# Patient Record
Sex: FEMALE | Race: WHITE | NOT HISPANIC OR LATINO | Employment: OTHER | URBAN - METROPOLITAN AREA
[De-identification: names, ages, dates, MRNs, and addresses within clinical notes are randomized per-mention and may not be internally consistent; named-entity substitution may affect disease eponyms.]

---

## 2024-06-02 ENCOUNTER — APPOINTMENT (EMERGENCY)
Dept: CT IMAGING | Facility: HOSPITAL | Age: 89
DRG: 194 | End: 2024-06-02
Payer: COMMERCIAL

## 2024-06-02 ENCOUNTER — HOSPITAL ENCOUNTER (INPATIENT)
Facility: HOSPITAL | Age: 89
LOS: 1 days | Discharge: NURSING, REHAB OR LONG-TERM CARE | DRG: 194 | End: 2024-06-03
Attending: STUDENT IN AN ORGANIZED HEALTH CARE EDUCATION/TRAINING PROGRAM | Admitting: INTERNAL MEDICINE
Payer: COMMERCIAL

## 2024-06-02 ENCOUNTER — APPOINTMENT (EMERGENCY)
Dept: RADIOLOGY | Facility: HOSPITAL | Age: 89
DRG: 194 | End: 2024-06-02
Payer: COMMERCIAL

## 2024-06-02 DIAGNOSIS — E03.9 HYPOTHYROID: ICD-10-CM

## 2024-06-02 DIAGNOSIS — J18.9 PNEUMONIA: ICD-10-CM

## 2024-06-02 DIAGNOSIS — R07.9 CHEST PAIN: ICD-10-CM

## 2024-06-02 DIAGNOSIS — J44.1 COPD EXACERBATION (HCC): Primary | ICD-10-CM

## 2024-06-02 PROBLEM — I48.91 A-FIB (HCC): Status: ACTIVE | Noted: 2024-06-02

## 2024-06-02 PROBLEM — R19.7 DIARRHEA: Status: ACTIVE | Noted: 2024-06-02

## 2024-06-02 PROBLEM — I50.32 CHRONIC DIASTOLIC HEART FAILURE (HCC): Status: ACTIVE | Noted: 2024-06-02

## 2024-06-02 PROBLEM — J44.9 COPD (CHRONIC OBSTRUCTIVE PULMONARY DISEASE) (HCC): Status: ACTIVE | Noted: 2024-06-02

## 2024-06-02 PROBLEM — Z86.73 HX OF TIA (TRANSIENT ISCHEMIC ATTACK) AND STROKE: Status: ACTIVE | Noted: 2024-06-02

## 2024-06-02 PROBLEM — R13.10 DYSPHAGIA: Status: ACTIVE | Noted: 2024-06-02

## 2024-06-02 PROBLEM — R06.89 ACUTE RESPIRATORY INSUFFICIENCY: Status: ACTIVE | Noted: 2024-06-02

## 2024-06-02 PROBLEM — R79.89 ELEVATED TSH: Status: ACTIVE | Noted: 2024-06-02

## 2024-06-02 LAB
2HR DELTA HS TROPONIN: -2 NG/L
ALBUMIN SERPL BCP-MCNC: 3.8 G/DL (ref 3.5–5)
ALP SERPL-CCNC: 60 U/L (ref 34–104)
ALT SERPL W P-5'-P-CCNC: 6 U/L (ref 7–52)
ANION GAP SERPL CALCULATED.3IONS-SCNC: 7 MMOL/L (ref 4–13)
AST SERPL W P-5'-P-CCNC: 16 U/L (ref 13–39)
BASOPHILS # BLD AUTO: 0.05 THOUSANDS/ÂΜL (ref 0–0.1)
BASOPHILS NFR BLD AUTO: 1 % (ref 0–1)
BILIRUB SERPL-MCNC: 0.7 MG/DL (ref 0.2–1)
BNP SERPL-MCNC: 73 PG/ML (ref 0–100)
BUN SERPL-MCNC: 13 MG/DL (ref 5–25)
CALCIUM SERPL-MCNC: 9.4 MG/DL (ref 8.4–10.2)
CARDIAC TROPONIN I PNL SERPL HS: 6 NG/L
CARDIAC TROPONIN I PNL SERPL HS: 8 NG/L
CHLORIDE SERPL-SCNC: 104 MMOL/L (ref 96–108)
CO2 SERPL-SCNC: 31 MMOL/L (ref 21–32)
CREAT SERPL-MCNC: 1 MG/DL (ref 0.6–1.3)
D DIMER PPP FEU-MCNC: 0.72 UG/ML FEU
EOSINOPHIL # BLD AUTO: 0.23 THOUSAND/ÂΜL (ref 0–0.61)
EOSINOPHIL NFR BLD AUTO: 3 % (ref 0–6)
ERYTHROCYTE [DISTWIDTH] IN BLOOD BY AUTOMATED COUNT: 12.6 % (ref 11.6–15.1)
FLUAV RNA RESP QL NAA+PROBE: NEGATIVE
FLUBV RNA RESP QL NAA+PROBE: NEGATIVE
GFR SERPL CREATININE-BSD FRML MDRD: 49 ML/MIN/1.73SQ M
GLUCOSE SERPL-MCNC: 115 MG/DL (ref 65–140)
HCT VFR BLD AUTO: 46.5 % (ref 34.8–46.1)
HGB BLD-MCNC: 15 G/DL (ref 11.5–15.4)
IMM GRANULOCYTES # BLD AUTO: 0.03 THOUSAND/UL (ref 0–0.2)
IMM GRANULOCYTES NFR BLD AUTO: 0 % (ref 0–2)
L PNEUMO1 AG UR QL IA.RAPID: NEGATIVE
LIPASE SERPL-CCNC: 28 U/L (ref 11–82)
LYMPHOCYTES # BLD AUTO: 3.27 THOUSANDS/ÂΜL (ref 0.6–4.47)
LYMPHOCYTES NFR BLD AUTO: 44 % (ref 14–44)
MCH RBC QN AUTO: 31.6 PG (ref 26.8–34.3)
MCHC RBC AUTO-ENTMCNC: 32.3 G/DL (ref 31.4–37.4)
MCV RBC AUTO: 98 FL (ref 82–98)
MONOCYTES # BLD AUTO: 0.51 THOUSAND/ÂΜL (ref 0.17–1.22)
MONOCYTES NFR BLD AUTO: 7 % (ref 4–12)
NEUTROPHILS # BLD AUTO: 3.41 THOUSANDS/ÂΜL (ref 1.85–7.62)
NEUTS SEG NFR BLD AUTO: 45 % (ref 43–75)
NRBC BLD AUTO-RTO: 0 /100 WBCS
PLATELET # BLD AUTO: 217 THOUSANDS/UL (ref 149–390)
PMV BLD AUTO: 10.2 FL (ref 8.9–12.7)
POTASSIUM SERPL-SCNC: 3.7 MMOL/L (ref 3.5–5.3)
PROCALCITONIN SERPL-MCNC: <0.05 NG/ML
PROT SERPL-MCNC: 6.6 G/DL (ref 6.4–8.4)
RBC # BLD AUTO: 4.75 MILLION/UL (ref 3.81–5.12)
RSV RNA RESP QL NAA+PROBE: NEGATIVE
SARS-COV-2 RNA RESP QL NAA+PROBE: NEGATIVE
SODIUM SERPL-SCNC: 142 MMOL/L (ref 135–147)
TSH SERPL DL<=0.05 MIU/L-ACNC: 10.36 UIU/ML (ref 0.45–4.5)
WBC # BLD AUTO: 7.5 THOUSAND/UL (ref 4.31–10.16)

## 2024-06-02 PROCEDURE — 87081 CULTURE SCREEN ONLY: CPT | Performed by: PHYSICIAN ASSISTANT

## 2024-06-02 PROCEDURE — 99285 EMERGENCY DEPT VISIT HI MDM: CPT

## 2024-06-02 PROCEDURE — 1123F ACP DISCUSS/DSCN MKR DOCD: CPT | Performed by: STUDENT IN AN ORGANIZED HEALTH CARE EDUCATION/TRAINING PROGRAM

## 2024-06-02 PROCEDURE — 84484 ASSAY OF TROPONIN QUANT: CPT | Performed by: STUDENT IN AN ORGANIZED HEALTH CARE EDUCATION/TRAINING PROGRAM

## 2024-06-02 PROCEDURE — 94760 N-INVAS EAR/PLS OXIMETRY 1: CPT

## 2024-06-02 PROCEDURE — 85379 FIBRIN DEGRADATION QUANT: CPT | Performed by: STUDENT IN AN ORGANIZED HEALTH CARE EDUCATION/TRAINING PROGRAM

## 2024-06-02 PROCEDURE — 84145 PROCALCITONIN (PCT): CPT | Performed by: STUDENT IN AN ORGANIZED HEALTH CARE EDUCATION/TRAINING PROGRAM

## 2024-06-02 PROCEDURE — 71250 CT THORAX DX C-: CPT

## 2024-06-02 PROCEDURE — 0241U HB NFCT DS VIR RESP RNA 4 TRGT: CPT | Performed by: STUDENT IN AN ORGANIZED HEALTH CARE EDUCATION/TRAINING PROGRAM

## 2024-06-02 PROCEDURE — 87147 CULTURE TYPE IMMUNOLOGIC: CPT | Performed by: PHYSICIAN ASSISTANT

## 2024-06-02 PROCEDURE — 94664 DEMO&/EVAL PT USE INHALER: CPT

## 2024-06-02 PROCEDURE — 83880 ASSAY OF NATRIURETIC PEPTIDE: CPT | Performed by: STUDENT IN AN ORGANIZED HEALTH CARE EDUCATION/TRAINING PROGRAM

## 2024-06-02 PROCEDURE — 74176 CT ABD & PELVIS W/O CONTRAST: CPT

## 2024-06-02 PROCEDURE — 71045 X-RAY EXAM CHEST 1 VIEW: CPT

## 2024-06-02 PROCEDURE — 36415 COLL VENOUS BLD VENIPUNCTURE: CPT | Performed by: STUDENT IN AN ORGANIZED HEALTH CARE EDUCATION/TRAINING PROGRAM

## 2024-06-02 PROCEDURE — 94640 AIRWAY INHALATION TREATMENT: CPT

## 2024-06-02 PROCEDURE — 85025 COMPLETE CBC W/AUTO DIFF WBC: CPT | Performed by: STUDENT IN AN ORGANIZED HEALTH CARE EDUCATION/TRAINING PROGRAM

## 2024-06-02 PROCEDURE — 93005 ELECTROCARDIOGRAM TRACING: CPT

## 2024-06-02 PROCEDURE — 80053 COMPREHEN METABOLIC PANEL: CPT | Performed by: STUDENT IN AN ORGANIZED HEALTH CARE EDUCATION/TRAINING PROGRAM

## 2024-06-02 PROCEDURE — 70450 CT HEAD/BRAIN W/O DYE: CPT

## 2024-06-02 PROCEDURE — 99285 EMERGENCY DEPT VISIT HI MDM: CPT | Performed by: STUDENT IN AN ORGANIZED HEALTH CARE EDUCATION/TRAINING PROGRAM

## 2024-06-02 PROCEDURE — 84443 ASSAY THYROID STIM HORMONE: CPT | Performed by: STUDENT IN AN ORGANIZED HEALTH CARE EDUCATION/TRAINING PROGRAM

## 2024-06-02 PROCEDURE — 99223 1ST HOSP IP/OBS HIGH 75: CPT | Performed by: STUDENT IN AN ORGANIZED HEALTH CARE EDUCATION/TRAINING PROGRAM

## 2024-06-02 PROCEDURE — 87449 NOS EACH ORGANISM AG IA: CPT | Performed by: PHYSICIAN ASSISTANT

## 2024-06-02 PROCEDURE — 96374 THER/PROPH/DIAG INJ IV PUSH: CPT

## 2024-06-02 PROCEDURE — 92610 EVALUATE SWALLOWING FUNCTION: CPT

## 2024-06-02 PROCEDURE — 83690 ASSAY OF LIPASE: CPT | Performed by: STUDENT IN AN ORGANIZED HEALTH CARE EDUCATION/TRAINING PROGRAM

## 2024-06-02 RX ORDER — LEVOTHYROXINE SODIUM 0.05 MG/1
50 TABLET ORAL DAILY
Status: DISCONTINUED | OUTPATIENT
Start: 2024-06-02 | End: 2024-06-03 | Stop reason: HOSPADM

## 2024-06-02 RX ORDER — SODIUM CHLORIDE, SODIUM GLUCONATE, SODIUM ACETATE, POTASSIUM CHLORIDE, MAGNESIUM CHLORIDE, SODIUM PHOSPHATE, DIBASIC, AND POTASSIUM PHOSPHATE .53; .5; .37; .037; .03; .012; .00082 G/100ML; G/100ML; G/100ML; G/100ML; G/100ML; G/100ML; G/100ML
500 INJECTION, SOLUTION INTRAVENOUS ONCE
Status: COMPLETED | OUTPATIENT
Start: 2024-06-02 | End: 2024-06-02

## 2024-06-02 RX ORDER — LANOLIN ALCOHOL/MO/W.PET/CERES
6 CREAM (GRAM) TOPICAL
Status: DISCONTINUED | OUTPATIENT
Start: 2024-06-02 | End: 2024-06-03 | Stop reason: HOSPADM

## 2024-06-02 RX ORDER — FLUOXETINE HYDROCHLORIDE 20 MG/1
20 CAPSULE ORAL DAILY
Status: DISCONTINUED | OUTPATIENT
Start: 2024-06-02 | End: 2024-06-03 | Stop reason: HOSPADM

## 2024-06-02 RX ORDER — ACETAMINOPHEN 325 MG/1
650 TABLET ORAL EVERY 6 HOURS PRN
Status: DISCONTINUED | OUTPATIENT
Start: 2024-06-02 | End: 2024-06-03 | Stop reason: HOSPADM

## 2024-06-02 RX ORDER — AZITHROMYCIN 500 MG/1
500 TABLET, FILM COATED ORAL EVERY 24 HOURS
Status: DISCONTINUED | OUTPATIENT
Start: 2024-06-03 | End: 2024-06-03 | Stop reason: HOSPADM

## 2024-06-02 RX ORDER — GABAPENTIN 100 MG/1
100 CAPSULE ORAL DAILY
COMMUNITY

## 2024-06-02 RX ORDER — METHYLPREDNISOLONE SODIUM SUCCINATE 125 MG/2ML
125 INJECTION, POWDER, LYOPHILIZED, FOR SOLUTION INTRAMUSCULAR; INTRAVENOUS ONCE
Status: COMPLETED | OUTPATIENT
Start: 2024-06-02 | End: 2024-06-02

## 2024-06-02 RX ORDER — CLOPIDOGREL BISULFATE 75 MG/1
75 TABLET ORAL DAILY
Status: DISCONTINUED | OUTPATIENT
Start: 2024-06-02 | End: 2024-06-03 | Stop reason: HOSPADM

## 2024-06-02 RX ORDER — NYSTATIN 100000 [USP'U]/G
POWDER TOPICAL 2 TIMES DAILY
Status: DISCONTINUED | OUTPATIENT
Start: 2024-06-02 | End: 2024-06-03 | Stop reason: HOSPADM

## 2024-06-02 RX ORDER — UREA 10 %
5 LOTION (ML) TOPICAL
Status: DISCONTINUED | OUTPATIENT
Start: 2024-06-02 | End: 2024-06-02

## 2024-06-02 RX ORDER — FLUOXETINE 20 MG/1
1 TABLET, FILM COATED ORAL DAILY
COMMUNITY

## 2024-06-02 RX ORDER — ENOXAPARIN SODIUM 100 MG/ML
40 INJECTION SUBCUTANEOUS DAILY
Status: DISCONTINUED | OUTPATIENT
Start: 2024-06-02 | End: 2024-06-03 | Stop reason: HOSPADM

## 2024-06-02 RX ORDER — PANTOPRAZOLE SODIUM 40 MG/1
40 TABLET, DELAYED RELEASE ORAL DAILY
COMMUNITY

## 2024-06-02 RX ORDER — CLOPIDOGREL BISULFATE 75 MG/1
1 TABLET ORAL DAILY
COMMUNITY

## 2024-06-02 RX ORDER — GUAIFENESIN 600 MG/1
600 TABLET, EXTENDED RELEASE ORAL 2 TIMES DAILY
Status: DISCONTINUED | OUTPATIENT
Start: 2024-06-02 | End: 2024-06-03 | Stop reason: HOSPADM

## 2024-06-02 RX ORDER — CEFTRIAXONE 2 G/50ML
2000 INJECTION, SOLUTION INTRAVENOUS ONCE
Status: COMPLETED | OUTPATIENT
Start: 2024-06-02 | End: 2024-06-02

## 2024-06-02 RX ORDER — MAGNESIUM HYDROXIDE/ALUMINUM HYDROXICE/SIMETHICONE 120; 1200; 1200 MG/30ML; MG/30ML; MG/30ML
30 SUSPENSION ORAL EVERY 6 HOURS PRN
Status: DISCONTINUED | OUTPATIENT
Start: 2024-06-02 | End: 2024-06-03 | Stop reason: HOSPADM

## 2024-06-02 RX ORDER — CALCIUM CARBONATE 300MG(750)
400 TABLET,CHEWABLE ORAL
Status: ON HOLD | COMMUNITY
End: 2024-06-02 | Stop reason: ALTCHOICE

## 2024-06-02 RX ORDER — GABAPENTIN 100 MG/1
100 CAPSULE ORAL DAILY
Status: DISCONTINUED | OUTPATIENT
Start: 2024-06-02 | End: 2024-06-03 | Stop reason: HOSPADM

## 2024-06-02 RX ORDER — OMEGA-3S/DHA/EPA/FISH OIL/D3 300MG-1000
2000 CAPSULE ORAL DAILY
COMMUNITY

## 2024-06-02 RX ORDER — BENZONATATE 100 MG/1
100 CAPSULE ORAL 3 TIMES DAILY PRN
Status: DISCONTINUED | OUTPATIENT
Start: 2024-06-02 | End: 2024-06-03 | Stop reason: HOSPADM

## 2024-06-02 RX ORDER — UREA 10 %
6 LOTION (ML) TOPICAL
Status: DISCONTINUED | OUTPATIENT
Start: 2024-06-02 | End: 2024-06-02

## 2024-06-02 RX ORDER — ONDANSETRON 2 MG/ML
4 INJECTION INTRAMUSCULAR; INTRAVENOUS EVERY 6 HOURS PRN
Status: DISCONTINUED | OUTPATIENT
Start: 2024-06-02 | End: 2024-06-03 | Stop reason: HOSPADM

## 2024-06-02 RX ORDER — SODIUM CHLORIDE, SODIUM GLUCONATE, SODIUM ACETATE, POTASSIUM CHLORIDE, MAGNESIUM CHLORIDE, SODIUM PHOSPHATE, DIBASIC, AND POTASSIUM PHOSPHATE .53; .5; .37; .037; .03; .012; .00082 G/100ML; G/100ML; G/100ML; G/100ML; G/100ML; G/100ML; G/100ML
100 INJECTION, SOLUTION INTRAVENOUS CONTINUOUS
Status: DISCONTINUED | OUTPATIENT
Start: 2024-06-02 | End: 2024-06-03 | Stop reason: HOSPADM

## 2024-06-02 RX ORDER — NYSTATIN 100000 [USP'U]/G
POWDER TOPICAL 2 TIMES DAILY
COMMUNITY

## 2024-06-02 RX ORDER — SODIUM CHLORIDE 9 MG/ML
3 INJECTION INTRAVENOUS
Status: DISCONTINUED | OUTPATIENT
Start: 2024-06-02 | End: 2024-06-02

## 2024-06-02 RX ORDER — UREA 10 %
5 LOTION (ML) TOPICAL
COMMUNITY

## 2024-06-02 RX ORDER — LEVOTHYROXINE SODIUM 0.03 MG/1
25 TABLET ORAL DAILY
COMMUNITY
End: 2024-06-03

## 2024-06-02 RX ORDER — LANOLIN ALCOHOL/MO/W.PET/CERES
1000 CREAM (GRAM) TOPICAL DAILY
COMMUNITY

## 2024-06-02 RX ORDER — PANTOPRAZOLE SODIUM 40 MG/1
40 TABLET, DELAYED RELEASE ORAL DAILY
Status: DISCONTINUED | OUTPATIENT
Start: 2024-06-02 | End: 2024-06-03 | Stop reason: HOSPADM

## 2024-06-02 RX ORDER — LEVALBUTEROL INHALATION SOLUTION 1.25 MG/3ML
1.25 SOLUTION RESPIRATORY (INHALATION)
Status: DISCONTINUED | OUTPATIENT
Start: 2024-06-02 | End: 2024-06-03 | Stop reason: HOSPADM

## 2024-06-02 RX ORDER — CEFTRIAXONE 2 G/50ML
2000 INJECTION, SOLUTION INTRAVENOUS EVERY 24 HOURS
Status: DISCONTINUED | OUTPATIENT
Start: 2024-06-03 | End: 2024-06-03 | Stop reason: HOSPADM

## 2024-06-02 RX ADMIN — SODIUM CHLORIDE, SODIUM GLUCONATE, SODIUM ACETATE, POTASSIUM CHLORIDE, MAGNESIUM CHLORIDE, SODIUM PHOSPHATE, DIBASIC, AND POTASSIUM PHOSPHATE 100 ML/HR: .53; .5; .37; .037; .03; .012; .00082 INJECTION, SOLUTION INTRAVENOUS at 23:31

## 2024-06-02 RX ADMIN — CYANOCOBALAMIN TAB 500 MCG 1000 MCG: 500 TAB at 09:01

## 2024-06-02 RX ADMIN — Medication 2000 UNITS: at 09:01

## 2024-06-02 RX ADMIN — IPRATROPIUM BROMIDE 0.5 MG: 0.5 SOLUTION RESPIRATORY (INHALATION) at 19:45

## 2024-06-02 RX ADMIN — ENOXAPARIN SODIUM 40 MG: 40 INJECTION SUBCUTANEOUS at 09:20

## 2024-06-02 RX ADMIN — AZITHROMYCIN MONOHYDRATE 500 MG: 500 INJECTION, POWDER, LYOPHILIZED, FOR SOLUTION INTRAVENOUS at 06:05

## 2024-06-02 RX ADMIN — Medication 6 MG: at 20:39

## 2024-06-02 RX ADMIN — GUAIFENESIN 600 MG: 600 TABLET ORAL at 09:00

## 2024-06-02 RX ADMIN — SODIUM CHLORIDE, SODIUM GLUCONATE, SODIUM ACETATE, POTASSIUM CHLORIDE, MAGNESIUM CHLORIDE, SODIUM PHOSPHATE, DIBASIC, AND POTASSIUM PHOSPHATE 500 ML: .53; .5; .37; .037; .03; .012; .00082 INJECTION, SOLUTION INTRAVENOUS at 21:20

## 2024-06-02 RX ADMIN — GUAIFENESIN 600 MG: 600 TABLET ORAL at 17:14

## 2024-06-02 RX ADMIN — GABAPENTIN 100 MG: 100 CAPSULE ORAL at 09:01

## 2024-06-02 RX ADMIN — IPRATROPIUM BROMIDE 0.5 MG: 0.5 SOLUTION RESPIRATORY (INHALATION) at 08:40

## 2024-06-02 RX ADMIN — IPRATROPIUM BROMIDE 0.5 MG: 0.5 SOLUTION RESPIRATORY (INHALATION) at 14:55

## 2024-06-02 RX ADMIN — ALUMINUM HYDROXIDE, MAGNESIUM HYDROXIDE, DIMETHICONE 30 ML: 200; 20; 200 SUSPENSION ORAL at 21:23

## 2024-06-02 RX ADMIN — NYSTATIN: 100000 POWDER TOPICAL at 17:15

## 2024-06-02 RX ADMIN — FLUOXETINE HYDROCHLORIDE 20 MG: 20 CAPSULE ORAL at 09:00

## 2024-06-02 RX ADMIN — LEVALBUTEROL HYDROCHLORIDE 1.25 MG: 1.25 SOLUTION RESPIRATORY (INHALATION) at 19:45

## 2024-06-02 RX ADMIN — LEVOTHYROXINE SODIUM 50 MCG: 50 TABLET ORAL at 07:16

## 2024-06-02 RX ADMIN — LEVALBUTEROL HYDROCHLORIDE 1.25 MG: 1.25 SOLUTION RESPIRATORY (INHALATION) at 08:40

## 2024-06-02 RX ADMIN — NYSTATIN 1 APPLICATION: 100000 POWDER TOPICAL at 09:17

## 2024-06-02 RX ADMIN — METHYLPREDNISOLONE SODIUM SUCCINATE 125 MG: 125 INJECTION, POWDER, FOR SOLUTION INTRAMUSCULAR; INTRAVENOUS at 05:49

## 2024-06-02 RX ADMIN — LEVALBUTEROL HYDROCHLORIDE 1.25 MG: 1.25 SOLUTION RESPIRATORY (INHALATION) at 14:55

## 2024-06-02 RX ADMIN — CEFTRIAXONE 2000 MG: 2 INJECTION, SOLUTION INTRAVENOUS at 05:51

## 2024-06-02 RX ADMIN — CLOPIDOGREL BISULFATE 75 MG: 75 TABLET ORAL at 09:01

## 2024-06-02 RX ADMIN — IPRATROPIUM BROMIDE 0.5 MG: 0.5 SOLUTION RESPIRATORY (INHALATION) at 03:08

## 2024-06-02 RX ADMIN — PANTOPRAZOLE SODIUM 40 MG: 40 TABLET, DELAYED RELEASE ORAL at 09:01

## 2024-06-02 NOTE — ASSESSMENT & PLAN NOTE
Daughter at bedside has noted that patient has had some coughing after eating soups or some fruits  Has not yet seen speech therapy  Will consult speech therapy for evaluation  Regular diet at facility, continue for now

## 2024-06-02 NOTE — ASSESSMENT & PLAN NOTE
Home regimen: Synthroid 25 mcg daily  TSH elevated at 10.361  Increase Synthroid to 50 mcg daily  Will need TSH in 4 to 6 weeks outpatient

## 2024-06-02 NOTE — ED PROVIDER NOTES
"History  Chief Complaint   Patient presents with    Chest Pain     EMS brought pt in from [facility] woke up with chest pressure.      HPI    89 yo female, pmhx of COPD not on home oxygen, hypothyroidism, depression, CAD, A-fib, TIA, on Plavix and aspirin, sent to the ED from [facility] facility via EMS for evaluation of chest pressure and shortness of breath.  Per [facility] documentation patient was reporting left-sided weakness/pain in her head and left arm and spit up blood.  Patient says she was having a nosebleed and swallowed it and likely thereafter spit up the blood.  Per EMS patient was complaining of chest pressure by the time they came to the scene and that is what patient is complaining of currently.  EMS reports patient's pulse ox 89 to 90% on room air.  She was placed on 2-3 L nasal cannula with improvement.  She then adds that she has been having upper abdominal pain.  The patient is a poor historian and her history changes frequently throughout her assessment. Pt denies any fever, chills, visual changes, numbness/tingling, weakness, nausea, vomiting, diarrhea, recent falls. Patient reported she spit out blood onto a tissue after nosebleed.  Patient does not currently have a nosebleed and has not had any episodes of coughing or coughing up any blood on arrival.  Patient's daughter arrived to bedside shortly after patient arrival and added that patient has hx of \"double pneumonia\", COVID19, and had mild cough/cold symptoms at facility 1.5 weeks ago.  Patient's daughter reports patient's oxygen can drop to the mid 80s and she instructs patient to just take deep breaths.  She reports patient has bad reaction to albuterol and does not take breathing treatments usually.      Prior to Admission Medications   Prescriptions Last Dose Informant Patient Reported? Taking?   FLUoxetine (PROzac) 20 MG tablet   Yes No   Sig: Take 1 tablet by mouth daily   Melatonin 5 MG TABS   Yes Yes "   Sig: Take 5 mg by mouth daily at bedtime   clopidogrel (PLAVIX) 75 mg tablet   Yes No   Sig: Take 1 tablet by mouth daily   gabapentin (NEURONTIN) 100 mg capsule   Yes Yes   Sig: Take 100 mg by mouth daily at bedtime      Facility-Administered Medications: None       Past Medical History:   Diagnosis Date    A-fib (HCC)     Chronic GERD     COPD (chronic obstructive pulmonary disease) (HCC)     Coronary artery disease     Depression     Diabetes mellitus (HCC)     Erythema     Hypokalemia     Hypothyroidism     Idiopathic neuropathy     Insomnia     TIA (transient ischemic attack)     Vitamin deficiency        History reviewed. No pertinent surgical history.    History reviewed. No pertinent family history.  I have reviewed and agree with the history as documented.    E-Cigarette/Vaping     E-Cigarette/Vaping Substances     Social History     Tobacco Use    Smoking status: Never    Smokeless tobacco: Never   Substance Use Topics    Alcohol use: Never    Drug use: Never       Review of Systems    Physical Exam  Physical Exam  Vitals and nursing note reviewed.   Constitutional:       General: She is not in acute distress.     Appearance: She is not ill-appearing, toxic-appearing or diaphoretic.      Comments: Elderly female, no acute distress.   HENT:      Head: Normocephalic and atraumatic.      Mouth/Throat:      Pharynx: Oropharynx is clear.   Eyes:      Pupils: Pupils are equal, round, and reactive to light.   Cardiovascular:      Rate and Rhythm: Regular rhythm. Bradycardia present.      Comments: HR 56  Pulmonary:      Effort: Pulmonary effort is normal. No respiratory distress.      Breath sounds: Decreased breath sounds present. No wheezing.   Chest:      Chest wall: No tenderness.   Abdominal:      General: There is no distension.      Palpations: Abdomen is soft.      Tenderness: There is abdominal tenderness (mild, epigastrium).   Musculoskeletal:      Cervical back: Neck supple.      Right lower leg: No  edema.      Left lower leg: No edema.   Skin:     General: Skin is warm and dry.   Neurological:      General: No focal deficit present.      Mental Status: She is alert and oriented to person, place, and time. Mental status is at baseline.   Psychiatric:         Mood and Affect: Mood normal.         Behavior: Behavior normal.         Vital Signs  ED Triage Vitals   Temperature Pulse Respirations Blood Pressure SpO2   06/02/24 0057 06/02/24 0057 06/02/24 0057 06/02/24 0057 06/02/24 0057   98.6 °F (37 °C) 62 20 131/61 96 %      Temp Source Heart Rate Source Patient Position - Orthostatic VS BP Location FiO2 (%)   06/02/24 0057 06/02/24 0057 06/02/24 0057 06/02/24 0057 --   Oral Monitor Lying Right arm       Pain Score       06/02/24 0400       No Pain           Vitals:    06/02/24 0230 06/02/24 0305 06/02/24 0400 06/02/24 0633   BP: 130/63 128/60 111/55 133/73   Pulse: 60 56 56 61   Patient Position - Orthostatic VS: Sitting   Lying         Visual Acuity      ED Medications  Medications   sodium chloride (PF) 0.9 % injection 3 mL (has no administration in time range)   azithromycin (ZITHROMAX) 500 mg in sodium chloride 0.9% 250mL IVPB 500 mg (500 mg Intravenous New Bag 6/2/24 0605)   ipratropium (ATROVENT) 0.02 % inhalation solution 0.5 mg (0.5 mg Nebulization Given 6/2/24 0308)   cefTRIAXone (ROCEPHIN) IVPB (premix in dextrose) 2,000 mg 50 mL (2,000 mg Intravenous Continue to Inpatient Floor 6/2/24 0602)   methylPREDNISolone sodium succinate (Solu-MEDROL) injection 125 mg (125 mg Intravenous Given 6/2/24 0549)       Diagnostic Studies  Results Reviewed       Procedure Component Value Units Date/Time    FLU/RSV/COVID - if FLU/RSV clinically relevant [[id number]]  (Normal) Collected: 06/02/24 0310    Lab Status: Final result Specimen: Nares from Nose Updated: 06/02/24 0355     SARS-CoV-2 Negative     INFLUENZA A PCR Negative     INFLUENZA B PCR Negative     RSV PCR Negative    Narrative:      FOR PEDIATRIC PATIENTS -  copy/paste COVID Guidelines URL to browser: [email or web address]    SARS-CoV-2 assay is a Nucleic Acid Amplification assay intended for the  qualitative detection of nucleic acid from SARS-CoV-2 in nasopharyngeal  swabs. Results are for the presumptive identification of SARS-CoV-2 RNA.    Positive results are indicative of infection with SARS-CoV-2, the virus  causing COVID-19, but do not rule out bacterial infection or co-infection  with other viruses. Laboratories within the [place] and its  territories are required to report all positive results to the appropriate  public health authorities. Negative results do not preclude SARS-CoV-2  infection and should not be used as the sole basis for treatment or other  patient management decisions. Negative results must be combined with  clinical observations, patient history, and epidemiological information.  This test has not been FDA cleared or approved.    This test has been authorized by FDA under an Emergency Use Authorization  (EUA). This test is only authorized for the duration of time the  declaration that circumstances exist justifying the authorization of the  emergency use of an in vitro diagnostic tests for detection of SARS-CoV-2  virus and/or diagnosis of COVID-19 infection under section 564(b)(1) of  the Act, 21 U.S.C. 360bbb-3(b)(1), unless the authorization is terminated  or revoked sooner. The test has been validated but independent review by FDA  and CLIA is pending.    Test performed using [employer] GeneXpert: This RT-PCR assay targets N2,  a region unique to SARS-CoV-2. A conserved region in the E-gene was chosen  for pan-Sarbecovirus detection which includes SARS-CoV-2.    According to CMS-2020-01-R, this platform meets the definition of high-throughput technology.    HS Troponin I 2hr [[id number]]  (Normal) Collected: 06/02/24 0310    Lab Status: Final result Specimen: Blood from Arm, Right  Updated: 06/02/24 0347     hs TnI 2hr 6 ng/L      Delta 2hr hsTnI -2 ng/L     Procalcitonin [[id number]]  (Normal) Collected: 06/02/24 0101    Lab Status: Final result Specimen: Blood from Arm, Left Updated: 06/02/24 0339     Procalcitonin <0.05 ng/ml     TSH [[id number]]  (Abnormal) Collected: 06/02/24 0101    Lab Status: Final result Specimen: Blood from Arm, Left Updated: 06/02/24 0339     TSH 3RD GENERATON 10.361 uIU/mL     HS Troponin I 4hr [[id number]]     Lab Status: No result Specimen: Blood     D-dimer, quantitative [[id number]]  (Abnormal) Collected: 06/02/24 0101    Lab Status: Final result Specimen: Blood from Arm, Left Updated: 06/02/24 0213     D-Dimer, Quant 0.72 ug/ml FEU     Narrative:      In the evaluation for possible pulmonary embolism, in the appropriate (Well's Score of 4 or less) patient, the age adjusted d-dimer cutoff for this patient can be calculated as:    Age x 0.01 (in ug/mL) for Age-adjusted D-dimer exclusion threshold for a patient over 50 years.    B-Type Natriuretic Peptide(BNP) [[id number]]  (Normal) Collected: 06/02/24 0101    Lab Status: Final result Specimen: Blood from Arm, Left Updated: 06/02/24 0141     BNP 73 pg/mL     HS Troponin 0hr (reflex protocol) [[id number]]  (Normal) Collected: 06/02/24 0101    Lab Status: Final result Specimen: Blood from Arm, Left Updated: 06/02/24 0133     hs TnI 0hr 8 ng/L     Lipase [[id number]]  (Normal) Collected: 06/02/24 0101    Lab Status: Final result Specimen: Blood from Arm, Left Updated: 06/02/24 0126     Lipase 28 u/L     Comprehensive metabolic panel [[id number]]  (Abnormal) Collected: 06/02/24 0101    Lab Status: Final result Specimen: Blood from Arm, Left Updated: 06/02/24 0126     Sodium 142 mmol/L      Potassium 3.7 mmol/L      Chloride 104 mmol/L      CO2 31 mmol/L      ANION GAP 7 mmol/L      BUN 13 mg/dL      Creatinine 1.00 mg/dL      Glucose 115 mg/dL      Calcium 9.4 mg/dL      AST 16 U/L      ALT 6 U/L      Alkaline Phosphatase 60  U/L      Total Protein 6.6 g/dL      Albumin 3.8 g/dL      Total Bilirubin 0.70 mg/dL      eGFR 49 ml/min/1.73sq m     Narrative:      National Kidney Disease Foundation guidelines for Chronic Kidney Disease (CKD):     Stage 1 with normal or high GFR (GFR > 90 mL/min/1.73 square meters)    Stage 2 Mild CKD (GFR = 60-89 mL/min/1.73 square meters)    Stage 3A Moderate CKD (GFR = 45-59 mL/min/1.73 square meters)    Stage 3B Moderate CKD (GFR = 30-44 mL/min/1.73 square meters)    Stage 4 Severe CKD (GFR = 15-29 mL/min/1.73 square meters)    Stage 5 End Stage CKD (GFR <15 mL/min/1.73 square meters)  Note: GFR calculation is accurate only with a steady state creatinine    CBC and differential [[id number]]  (Abnormal) Collected: 06/02/24 0101    Lab Status: Final result Specimen: Blood from Arm, Left Updated: 06/02/24 0112     WBC 7.50 Thousand/uL      RBC 4.75 Million/uL      Hemoglobin 15.0 g/dL      Hematocrit 46.5 %      MCV 98 fL      MCH 31.6 pg      MCHC 32.3 g/dL      RDW 12.6 %      MPV 10.2 fL      Platelets 217 Thousands/uL      nRBC 0 /100 WBCs      Segmented % 45 %      Immature Grans % 0 %      Lymphocytes % 44 %      Monocytes % 7 %      Eosinophils Relative 3 %      Basophils Relative 1 %      Absolute Neutrophils 3.41 Thousands/µL      Absolute Immature Grans 0.03 Thousand/uL      Absolute Lymphocytes 3.27 Thousands/µL      Absolute Monocytes 0.51 Thousand/µL      Eosinophils Absolute 0.23 Thousand/µL      Basophils Absolute 0.05 Thousands/µL                    CT chest abdomen pelvis wo contrast   Final Result by Arya Allan MD (06/02 0522)      1.  No lobar airspace consolidation, pleural effusion, or pneumothorax. Subtle hazy groundglass opacities in the right upper lobe may reflect an early infectious/inflammatory process, recommend clinical correlation. Mild bibasilar atelectasis.   2.  Age-indeterminate superior endplate L1 compression fracture deformity with mild loss of vertebral body height is  seen. Recommend correlation with physical exam findings for point tenderness in this region to help better determine acuity.   3.  No acute intra-abdominal/pelvic abnormalities noted with findings detailed above.      Workstation performed: [id number]         CT head without contrast   Final Result by Roderick Steele MD (06/02 0453)      No acute vascular territorial infarct, hemorrhage, or focal mass effect/midline shift.      Small age-indeterminate infarcts in the bilateral basal ganglia regions. These can be further evaluated with MRI as clinically warranted.      Age-related volume loss with mild chronic microangiopathic changes within the brain.                  Workstation performed: [id number]         X-ray chest 1 view portable   ED Interpretation by Carly Zhu DO (06/02 0126)   Interstitial edema vs atypical infection      Final Result by Stephanie Mcfarlane MD (06/02 0616)      No acute cardiopulmonary disease.            Workstation performed: [id number]                    Procedures  ECG 12 Lead Documentation Only    Date/Time: 6/2/2024 1:01 AM    Performed by: Carly Zhu DO  Authorized by: Carly Zhu DO    Indications / Diagnosis:  Chest pain  ECG reviewed by me, the ED Provider: yes    Patient location:  ED  Previous ECG:     Previous ECG:  Unavailable  Interpretation:     Interpretation: normal    Rate:     ECG rate:  59    ECG rate assessment: bradycardic    Rhythm:     Rhythm: sinus bradycardia      Rhythm comment:  With sinus arrhythmia  Ectopy:     Ectopy: none    QRS:     QRS axis:  Normal    QRS intervals:  Normal  Conduction:     Conduction: normal    ST segments:     ST segments:  Normal  T waves:     T waves: normal    Comments:      No acute STD or JAMMIE c/f active ischemia  No STEMI      ECG 12 Lead Documentation Only    Date/Time: 6/2/2024 3:06 AM    Performed by: Carly Zhu DO  Authorized by: Carly Zhu DO    Indications / Diagnosis:  Chest pain  ECG  "reviewed by me, the ED Provider: yes    Patient location:  ED  Previous ECG:     Previous ECG:  Compared to current    Similarity:  No change  Interpretation:     Interpretation: non-specific    Rate:     ECG rate:  57    ECG rate assessment: bradycardic    Rhythm:     Rhythm: sinus bradycardia    ST segments:     ST segments:  Non-specific  Comments:      No acute STD or JAMMIE c/f active ischemia  No STEMI               ED Course  ED Course as of 06/02/24 0646   Sun Jun 02, 2024   0115 WBC: 7.50   0115 Hemoglobin: 15.0   0224 D-Dimer, Quant(!): 0.72  Age-adjusted negative   0225 BNP: 73   0225 hs TnI 0hr: 8   0225 LIPASE: 28   0243 Pt currently denies any chest pressure. Daughter is requesting her thyroid levels to be checked due to recent thyroid medication changes.   0342 Procalcitonin: <0.05   0348 Delta 2hr hsTnI: -2   0403 DC summary 6/2023: \"89-year-old female with past medical history of COPD, CHF, arthritis, CAD, hypertension, A-fib was brought in by daughter from home because acute onset of right-sided chest pain. Patient was recently admitted for pneumonia and pleural effusion with COVID on February and in March, she underwent pleurocentesis while inpatient. Patient was admitted for ACS rule out as well as to rule out pulmonary embolism. EKG showed sinus rhythm and did not show any acute ischemic changes. Troponins were trended and they were negative. Patient was complaining of neck and reproducible chest pain on palpation, CT cervical spine showed degenerative spur formation between C4 and C7 CT chest abdomen pelvis did not show any acute thoracic pathology, 3 cm left adnexal cyst was noticed and compression fracture of L1 vertebral body was noticed. As patient was allergic to iodine and she was taken for VQ scan to rule out PE; VQ scan was negative. Cardiologist was consulted, recommended conservative management. Patient was discharged to skilled nursing facility and was recommended to follow-up with her " "primary care provider.\"   0527 CT chest abdomen pelvis wo contrast  IMPRESSION:     1.  No lobar airspace consolidation, pleural effusion, or pneumothorax. Subtle hazy groundglass opacities in the right upper lobe may reflect an early infectious/inflammatory process, recommend clinical correlation. Mild bibasilar atelectasis.  2.  Age-indeterminate superior endplate L1 compression fracture deformity with mild loss of vertebral body height is seen. Recommend correlation with physical exam findings for point tenderness in this region to help better determine acuity.  3.  No acute intra-abdominal/pelvic abnormalities noted with findings detailed above.        0527 CT head without contrast  IMPRESSION:     No acute vascular territorial infarct, hemorrhage, or focal mass effect/midline shift.     Small age-indeterminate infarcts in the bilateral basal ganglia regions. These can be further evaluated with MRI as clinically warranted.     Age-related volume loss with mild chronic microangiopathic changes within the brain.     0535 Given comorbidities, oxygen requirement, and findings of PNA will give iV abx and admit to hospitalist.              Medical Decision Making  89 yo female, pmhx of COPD not on home oxygen, hypothyroidism, depression, CAD, A-fib, TIA, on Plavix and aspirin, sent to the ED from [facility] via EMS for evaluation of chest pressure and shortness of breath. AVSS, well appearing on 2 to 3 L nasal cannula satting in the mid 90s.  Per EMS, patient was 89% on room air and improved with nasal cannula.  Patient is in no acute distress. Lung sounds are diminished but do not appreciate any wheezing or focal findings.  Obtained broad workup including labs, viral panel, CT head, CT chest abdomen pelvis without contrast due to allergy. EKG nonischemic. Labs obtained and reviewed, troponins are negative. BNP is normal.  Patient does not appear volume overloaded on exam. CT imaging with findings of " possible early right upper lobe pneumonia. Started on IV CTX/azithromycin, solumedrol for suspected possible COPD exacerbation/PNA. Given new oxygen requirement and comorbidities, discussed with hospitalist for admission.       Amount and/or Complexity of Data Reviewed  Independent Historian: EMS     Details: daughter  External Data Reviewed: notes.  Labs: ordered. Decision-making details documented in ED Course.  Radiology: ordered and independent interpretation performed. Decision-making details documented in ED Course.  ECG/medicine tests: ordered and independent interpretation performed.    Risk  OTC drugs.  Prescription drug management.  Decision regarding hospitalization.             Disposition  Final diagnoses:   COPD exacerbation (HCC)   Chest pain   Pneumonia     Time reflects when diagnosis was documented in both MDM as applicable and the Disposition within this note       Time User Action Codes Description Comment    6/2/2024  5:49 AM Marko Carly Add [J44.1] COPD exacerbation (HCC)     6/2/2024  5:49 AM Marko, Carly Add [R07.9] Chest pain     6/2/2024  5:49 AM Marko Carly Add [J18.9] Pneumonia           ED Disposition       ED Disposition   Admit    Condition   Stable    Date/Time   Sun Jun 2, 2024  5:49 AM    Comment   Case was discussed with Amy Corbin and the patient's admission status was agreed to be Admission Status: inpatient status to the service of Dr. Valdez .               Follow-up Information    None         Current Discharge Medication List        CONTINUE these medications which have NOT CHANGED    Details   gabapentin (NEURONTIN) 100 mg capsule Take 100 mg by mouth daily at bedtime      Melatonin 5 MG TABS Take 5 mg by mouth daily at bedtime      clopidogrel (PLAVIX) 75 mg tablet Take 1 tablet by mouth daily      FLUoxetine (PROzac) 20 MG tablet Take 1 tablet by mouth daily             No discharge procedures on file.    PDMP Review       None            ED  Provider  Electronically Signed by Carly Zhu, DO  06/02/24 0647

## 2024-06-02 NOTE — ASSESSMENT & PLAN NOTE
Noted on nursing home records, however patient is not on any AC or rate controlling medications  Admission EKG showing sinus bradycardia

## 2024-06-02 NOTE — ASSESSMENT & PLAN NOTE
SpO2 88% on room air  Improved to > 92% on 2 L nasal cannula  Suspect secondary to early pneumonia  Wean oxygen as able

## 2024-06-02 NOTE — ASSESSMENT & PLAN NOTE
"Presents with dyspnea and chest pressure  Troponins negative, suspect pleuritic pain in setting of developing pneumonia  CT chest: \"No lobar airspace consolidation, pleural effusion, or pneumothorax. Subtle hazy groundglass opacities in the right upper lobe may reflect an early infectious/inflammatory process \"  Procalcitonin negative initially, repeat tomorrow  Continue ceftriaxone and azithromycin  History of COPD but not maintained on inhalers outpatient, will place on Xopenex/Atrovent 3 times daily due to pneumonia  Urine strep and Legionella studies  Sputum culture and Gram stain  "

## 2024-06-02 NOTE — ASSESSMENT & PLAN NOTE
History of COPD noted on nursing home records, however not maintained on any inhalers outpatient  No signs of active wheezing on admit, however will place on Xopenex/Atrovent 3 times daily in setting of possibly developing pneumonia with mild hypoxia  Will hold on further IV steroids at this point unless patient develops expiratory wheezing

## 2024-06-02 NOTE — ASSESSMENT & PLAN NOTE
Wt Readings from Last 3 Encounters:   06/02/24 60.3 kg (133 lb)     Last echo June 2023: Mild LV concentric hypertrophy.  Normal LVEF 55 to 60%.  Basal posterior hypokinesis.  No significant valvular disease  Examines euvolemic on admission, not maintained on diuretics outpatient  Monitor fluid status daily  Liberalized diet

## 2024-06-02 NOTE — ASSESSMENT & PLAN NOTE
Patient reports 2-3 episodes of diarrhea daily for the last week, however states that it has been slowing down  If stools become watery-will check stool cultures  CT A/P without any evidence of enteritis

## 2024-06-02 NOTE — SPEECH THERAPY NOTE
Speech Language/Pathology  Speech-Language Pathology Bedside Swallow Evaluation      Patient Name: Lynn Muñoz    Today's Date: 6/2/2024     Problem List  Principal Problem:    Community acquired pneumonia of right upper lobe of lung  Active Problems:    Chronic diastolic heart failure (HCC)    Hypothyroidism    Acute respiratory insufficiency    COPD (chronic obstructive pulmonary disease) (HCC)    Hx of TIA (transient ischemic attack) and stroke    A-fib (HCC)    Diarrhea    Dysphagia      Past Medical History  Past Medical History:   Diagnosis Date    A-fib (HCC)     Chronic GERD     COPD (chronic obstructive pulmonary disease) (HCC)     Coronary artery disease     Depression     Diabetes mellitus (HCC)     Erythema     Hypokalemia     Hypothyroidism     Idiopathic neuropathy     Insomnia     TIA (transient ischemic attack)     Vitamin deficiency        Past Surgical History  History reviewed. No pertinent surgical history.    Summary   The pt presented with increased difficulty with harder foods this date due to edentulous state but she is highly aware of what she can eat/has problems chewing or swallowing.  She is able to select items from the regular consistency diet.  Per family, she eats applesauce with everything at baseline to increase moisture/utilize as a wash.  No overt s/s aspiration or penetration noted.  Will follow to determine tolerance of mixed consistencies.  The family states that the pt has coughing with items like watermelon and soup due to the liquid and solid component.  For some fruits they do drain the juice.  Indicated draining the juice/liquid component is one strategy to decrease risk of aspiration.  Another is to add a thickener to the liquid portion.  Discussed specifics according to the pts preference.  Maximal support provided.  The results and recommendations were reviewed with the pt, Nurse, and family.     Recommended Diet: regular diet and thin liquids   Recommended Form of  "Meds:  Per pts preference (Nurse reports pt is tolerating)    Aspiration precautions and swallowing strategies: upright posture, small bites/sips, and alternating bites and sips  Other Recommendations: Continue frequent oral care at minimum three times daily.   Mixed consistencies (cereal & milk, soup, fruit with juice):  Drain liquid component or thicken liquid portion.  For fruits like grapes or watermelon- consider cutting into smaller pieces to decrease the \"burst\" like event when chewing them.       Current Medical Status  Chart reports, Pt is a 89 y.o. female who presented to [facility] with  pmhx of COPD not on home oxygen, hypothyroidism, depression, CAD, A-fib, TIA, GERD on Plavix and aspirin, sent to the ED from [facility] facility via EMS for evaluation of chest pressure and shortness of breath.  Per [facility] documentation patient was reporting left-sided weakness/pain in her head and left arm and spit up blood.  Patient says she was having a nosebleed and swallowed it and likely thereafter spit up the blood. .    Current Precautions:  Fall  Aspiration    Food Allergies:  Shellfish, the pt also mentioned that she can't eat fish.     Past medical history:  Please see H&P for details    Special Studies:  CT Chest 6/2: IMPRESSION:   1.  No lobar airspace consolidation, pleural effusion, or pneumothorax. Subtle hazy groundglass opacities in the right upper lobe may reflect an early infectious/inflammatory process, recommend clinical correlation. Mild bibasilar atelectasis.  2.  Age-indeterminate superior endplate L1 compression fracture deformity with mild loss of vertebral body height is seen. Recommend correlation with physical exam findings for point tenderness in this region to help better determine acuity.  3.  No acute intra-abdominal/pelvic abnormalities noted with findings detailed above.    CT Head 6/2: IMPRESSION:   No acute vascular territorial infarct, " hemorrhage, or focal mass effect/midline shift.   Small age-indeterminate infarcts in the bilateral basal ganglia regions. These can be further evaluated with MRI as clinically warranted.   Age-related volume loss with mild chronic microangiopathic changes within the brain.    Social/Education/Vocational Hx:  Pt lives [address]. The chart indicates she lives in assisted living.     Swallow Information   Current Risks for Dysphagia & Aspiration:  cough with mixed consistencies  Current Symptoms/Concerns: change in respiratory status  Current Diet: regular diet and thin liquids   Baseline Diet: regular diet and thin liquids    Swallow History:  The pt avoids particulate items such as nuts and hamburger that will break down to ground beef.  She states these things can make her choke and she does not have teeth.  She also avoids harder items like cookies, pretzels, and hard crackers/biscuits.  She occasionally eats potato chips per report because they moisten easily.   It sounds as if her daughter brings her meals frequently as the daughter and grandchildren were there to provide history.  The daughter states that the pt has coughing with juicy fruits and soups.  Fruits that may cause the pt to cough include watermelon.  For canned peaches, the daughter drains the juice and the pt does not have problems. Provided education regarding mixed consistencies and that the pt is likely experiencing decreased oral control with the liquid portion.  Suggested draining the liquid portion, decreasing the bite size of the fruit to decrease the juiciness, and thickening the liquid portion such as with soups. The pt states she does not eat cereal & milk.     Baseline Assessment   Behavior/Cognition: alert  Speech/Language Status: able to participate in conversation and able to follow commands  Patient Positioning: upright in bed  Pain Status/Interventions/Response to Interventions: No report of or nonverbal indications of  "pain.    Swallow Mechanism Exam  Facial: symmetrical  Labial: WFL  Lingual: WFL  Velum: symmetrical  Mandible: adequate ROM  Dentition: edentulous  Vocal quality:clear/adequate   Volitional Cough: strong/productive   Respiratory Status: on O2 1L NC    Consistencies Assessed and Performance   Consistencies Administered: 1oz applesauce, 4oz thin water and apple juice via cup and straw (The pt does not like ice because it melts to particulate (small pieces) and she feels it sticking in her throat.  She also does not like water); 1 ravi doone.  The pt indicates not liking the ravi doone because it was \"too hard\" for her to chew with her gums.     Oral Stage:   The pt was able to fully breakdown the cookie consistency but she reports it was uncomfortable.  She states that she would typically not eat a cookie like that and would select something softer.  She utilized the applesauce to increase moisture and improve comfort with chewing. Rotary chew noted.  Bolus formation and transfer were functional with no significant oral residue noted.  No overt s/s reduced oral control- however cough with mixed consistencies reported by family, cannot r/o.    Pharyngeal Stage:   Swallowing initiation appeared prompt.  Laryngeal rise was palpated and judged to be within functional limits.  No coughing, throat clearing, change in vocal quality or respiratory status noted today. Reviewed the kitchenette- not mixed consistency items available for trials this date.  The pt did not display overt s/s aspiration or penetration with a liquid wash of the cookie.     Esophageal Concerns:  GERD mentioned in chart. Pt denies any difficulty.       Summary and Recommendations (see above)    Results Reviewed with: patient, RN, and family     Treatment Recommended: Yes, dysphagia therapy     Frequency of treatment: 1-2x    Patient Stated Goal:  To return home.     Dysphagia LTG  -Patient will demonstrate safe and effective oral intake (without overt " s/s significant oral/pharyngeal dysphagia including s/s penetration or aspiration) for the highest appropriate diet level.     Short Term Goals:  -Pt will tolerate Regular diet and thin liquid with no significant s/s oral or pharyngeal dysphagia across 1-3 diagnostic session/s        Speech Therapy Prognosis   Prognosis: good    Prognosis Considerations: age and medical status

## 2024-06-02 NOTE — PLAN OF CARE
Problem: PAIN - ADULT  Goal: Verbalizes/displays adequate comfort level or baseline comfort level  Description: Interventions:  - Encourage patient to monitor pain and request assistance  - Assess pain using appropriate pain scale  - Administer analgesics based on type and severity of pain and evaluate response  - Implement non-pharmacological measures as appropriate and evaluate response  - Consider cultural and social influences on pain and pain management  - Notify physician/advanced practitioner if interventions unsuccessful or patient reports new pain  Outcome: Progressing     Problem: INFECTION - ADULT  Goal: Absence or prevention of progression during hospitalization  Description: INTERVENTIONS:  - Assess and monitor for signs and symptoms of infection  - Monitor lab/diagnostic results  - Monitor all insertion sites, i.e. indwelling lines, tubes, and drains  - Monitor endotracheal if appropriate and nasal secretions for changes in amount and color  - [place] appropriate cooling/warming therapies per order  - Administer medications as ordered  - Instruct and encourage patient and family to use good hand hygiene technique  - Identify and instruct in appropriate isolation precautions for identified infection/condition  Outcome: Progressing  Goal: Absence of fever/infection during neutropenic period  Description: INTERVENTIONS:  - Monitor WBC    Outcome: Progressing     Problem: SAFETY ADULT  Goal: Patient will remain free of falls  Description: INTERVENTIONS:  - Educate patient/family on patient safety including physical limitations  - Instruct patient to call for assistance with activity   - Consult OT/PT to assist with strengthening/mobility   - Keep Call bell within reach  - Keep bed low and locked with side rails adjusted as appropriate  - Keep care items and personal belongings within reach  - Initiate and maintain comfort rounds  - Make Fall Risk Sign visible to staff  - Offer Toileting every 2 Hours,  in advance of need  - Initiate/Maintain bed alarm  - Obtain necessary fall risk management equipment:   - Apply yellow socks and bracelet for high fall risk patients  - Consider moving patient to room near nurses station  Outcome: Progressing  Goal: Maintain or return to baseline ADL function  Description: INTERVENTIONS:  -  Assess patient's ability to carry out ADLs; assess patient's baseline for ADL function and identify physical deficits which impact ability to perform ADLs (bathing, care of mouth/teeth, toileting, grooming, dressing, etc.)  - Assess/evaluate cause of self-care deficits   - Assess range of motion  - Assess patient's mobility; develop plan if impaired  - Assess patient's need for assistive devices and provide as appropriate  - Encourage maximum independence but intervene and supervise when necessary  - Involve family in performance of ADLs  - Assess for home care needs following discharge   - Consider OT consult to assist with ADL evaluation and planning for discharge  - Provide patient education as appropriate  Outcome: Progressing  Goal: Maintains/Returns to pre admission functional level  Description: INTERVENTIONS:  - Perform AM-PAC 6 Click Basic Mobility/ Daily Activity assessment daily.  - Set and communicate daily mobility goal to care team and patient/family/caregiver.   - Collaborate with rehabilitation services on mobility goals if consulted  - Perform Range of Motion 3 times a day.  - Reposition patient every 2 hours.  - Dangle patient 3 times a day  - Stand patient 3 times a day  - Ambulate patient 3 times a day  - Out of bed to chair 3 times a day   - Out of bed for meals 3 times a day  - Out of bed for toileting  - Record patient progress and toleration of activity level   Outcome: Progressing     Problem: DISCHARGE PLANNING  Goal: Discharge to home or other facility with appropriate resources  Description: INTERVENTIONS:  - Identify barriers to discharge w/patient and caregiver  -  Arrange for needed discharge resources and transportation as appropriate  - Identify discharge learning needs (meds, wound care, etc.)  - Arrange for interpretive services to assist at discharge as needed  - Refer to Case Management Department for coordinating discharge planning if the patient needs post-hospital services based on physician/advanced practitioner order or complex needs related to functional status, cognitive ability, or social support system  Outcome: Progressing     Problem: Knowledge Deficit  Goal: Patient/family/caregiver demonstrates understanding of disease process, treatment plan, medications, and discharge instructions  Description: Complete learning assessment and assess knowledge base.  Interventions:  - Provide teaching at level of understanding  - Provide teaching via preferred learning methods  Outcome: Progressing

## 2024-06-02 NOTE — H&P
"[facility]  H&P  Name: Lynn Muñoz 89 y.o. female I MRN: [id number]  Unit/Bed#: -01 I Date of Admission: 6/2/2024   Date of Service: 6/2/2024 I Hospital Day: 0      Assessment & Plan   * Community acquired pneumonia of right upper lobe of lung  Assessment & Plan  Presents with dyspnea and chest pressure  Troponins negative, suspect pleuritic pain in setting of developing pneumonia  CT chest: \"No lobar airspace consolidation, pleural effusion, or pneumothorax. Subtle hazy groundglass opacities in the right upper lobe may reflect an early infectious/inflammatory process \"  Procalcitonin negative initially, repeat tomorrow  Continue ceftriaxone and azithromycin  History of COPD but not maintained on inhalers outpatient, will place on Xopenex/Atrovent 3 times daily due to pneumonia  Urine strep and Legionella studies  Sputum culture and Gram stain    Hypothyroidism  Assessment & Plan  Home regimen: Synthroid 25 mcg daily  TSH elevated at 10.361  Increase Synthroid to 50 mcg daily  Will need TSH in 4 to 6 weeks outpatient    Diarrhea  Assessment & Plan  Patient reports 2-3 episodes of diarrhea daily for the last week, however states that it has been slowing down  If stools become watery-will check stool cultures  CT A/P without any evidence of enteritis    Acute respiratory insufficiency  Assessment & Plan  SpO2 88% on room air  Improved to > 92% on 2 L nasal cannula  Suspect secondary to early pneumonia  Wean oxygen as able    Dysphagia  Assessment & Plan  Daughter at bedside has noted that patient has had some coughing after eating soups or some fruits  Has not yet seen speech therapy  Will consult speech therapy for evaluation  Regular diet at facility, continue for now    Chronic diastolic heart failure (HCC)  Assessment & Plan  Wt Readings from Last 3 Encounters:   06/02/24 60.3 kg (133 lb)     Last echo June 2023: Mild LV concentric hypertrophy.  Normal LVEF 55 to 60%.  Basal " "posterior hypokinesis.  No significant valvular disease  Examines euvolemic on admission, not maintained on diuretics outpatient  Monitor fluid status daily  Liberalized diet    A-fib ([facility])  Assessment & Plan  Noted on nursing home records, however patient is not on any AC or rate controlling medications  Admission EKG showing sinus bradycardia    Hx of TIA (transient ischemic attack) and stroke  Assessment & Plan  Noted on nursing home records  Maintained on Plavix 75 Mg daily, continue    COPD (chronic obstructive pulmonary disease) ([facility])  Assessment & Plan  History of COPD noted on nursing home records, however not maintained on any inhalers outpatient  No signs of active wheezing on admit, however will place on Xopenex/Atrovent 3 times daily in setting of possibly developing pneumonia with mild hypoxia  Will hold on further IV steroids at this point unless patient develops expiratory wheezing           VTE Pharmacologic Prophylaxis: VTE Score: 6 High Risk (Score >/= 5) - Pharmacological DVT Prophylaxis Ordered: enoxaparin (Lovenox). Sequential Compression Devices Ordered.  Code Status: Level 3 - DNAR and DNI   Discussion with family: Updated  (daughter) at bedside.    Anticipated Length of Stay: Patient will be admitted on an inpatient basis with an anticipated length of stay of greater than 2 midnights secondary to community-acquired pneumonia, acute respiratory insufficiency.    Chief Complaint: \" Was having left arm pain and shortness of breath\"    History of Present Illness:  Lynn Muñoz is a 89 y.o. female with a PMH of COPD, TIA, A-fib, GERD, and diastolic heart failure who presents with multiple complaints.  Patient reports that she was having a nosebleed that caused her to then spit up blood.  This then resolved but then she began to have left arm pain that radiated to her left jaw.  She does endorse dyspnea but states that this not change from baseline.  She denies any cough.  No fevers or " chills.  Denies nausea or vomiting.  Endorses diarrhea for the last week but notes that has been slowing down.  No urinary symptoms.    Daughter at bedside reports that patient has had some coughing when eating juicy fruits or soups.  She has not yet seen speech therapy.    Review of Systems:  Review of Systems   Constitutional:  Negative for chills and fever.   HENT:  Positive for nosebleeds. Negative for congestion.    Respiratory:  Positive for shortness of breath. Negative for cough.    Cardiovascular:  Negative for leg swelling.        Positive for arm and jaw pain   Gastrointestinal:  Positive for diarrhea. Negative for nausea and vomiting.   Genitourinary:  Negative for difficulty urinating and dysuria.   Musculoskeletal:  Positive for gait problem.   Neurological:  Negative for weakness and numbness.   All other systems reviewed and are negative.      Past Medical and Surgical History:   Past Medical History:   Diagnosis Date    A-fib ([facility])     Chronic GERD     COPD (chronic obstructive pulmonary disease) ([facility])     Coronary artery disease     Depression     Diabetes mellitus (HCC)     Erythema     Hypokalemia     Hypothyroidism     Idiopathic neuropathy     Insomnia     TIA (transient ischemic attack)     Vitamin deficiency        History reviewed. No pertinent surgical history.    Meds/Allergies:  Prior to Admission medications    Medication Sig Start Date End Date Taking? Authorizing Provider   clopidogrel (PLAVIX) 75 mg tablet Take 1 tablet by mouth daily    Historical Provider, MD   FLUoxetine (PROzac) 20 MG tablet Take 1 tablet by mouth daily    Historical Provider, MD   Magnesium 400 MG TABS Take 400 mg by mouth    Historical Provider, MD     I have reveiwed home medications using records provided by [facility].    Allergies:   Allergies   Allergen Reactions    Iodine - Food Allergy Edema    Shellfish-Derived Products - Food Allergy Facial Swelling       Social History:  Marital Status:    Occupation:  "Retired  Patient Pre-hospital Living Situation: Assisted Living  Patient Pre-hospital Level of Mobility: walks with walker  Patient Pre-hospital Diet Restrictions: Regular diet  Substance Use History:   Social History     Substance and Sexual Activity   Alcohol Use Never     Social History     Tobacco Use   Smoking Status Never   Smokeless Tobacco Never     Social History     Substance and Sexual Activity   Drug Use Never       Family History:  History reviewed. No pertinent family history.    Physical Exam:     Vitals:   Blood Pressure: 138/71 (06/02/24 0705)  Pulse: 61 (06/02/24 0705)  Temperature: (!) 96.4 °F (35.8 °C) (06/02/24 0705)  Temp Source: Temporal (06/02/24 0633)  Respirations: 18 (06/02/24 0633)  Height: 5' 2\" (157.5 cm) (06/02/24 0633)  Weight - Scale: 60.3 kg (133 lb) (06/02/24 0551)  SpO2: (!) 89 % (06/02/24 0705)    Physical Exam  Vitals and nursing note reviewed.   Constitutional:       General: She is not in acute distress.     Appearance: Normal appearance. She is not ill-appearing.      Comments: Pleasant and conversational   HENT:      Head: Normocephalic.      Nose: Nose normal.      Mouth/Throat:      Mouth: Mucous membranes are moist.   Eyes:      Conjunctiva/sclera: Conjunctivae normal.   Cardiovascular:      Rate and Rhythm: Regular rhythm. Bradycardia present.      Pulses: Normal pulses.      Heart sounds: No murmur heard.  Pulmonary:      Comments: Lungs are without rales, rhonchi, or wheezing.  No conversational dyspnea.  No tachypnea.  Abdominal:      General: Abdomen is flat.      Palpations: Abdomen is soft.      Tenderness: There is no abdominal tenderness. There is no guarding or rebound.   Musculoskeletal:         General: Normal range of motion.      Cervical back: Normal range of motion.      Right lower leg: No edema.      Left lower leg: No edema.   Skin:     General: Skin is warm and dry.      Coloration: Skin is not pale.   Neurological:      General: No focal deficit " present.      Mental Status: She is alert.      Comments: Oriented to self, place, and month.  Said the year was 2004..  Unable to name president.   Psychiatric:         Mood and Affect: Mood normal.         Thought Content: Thought content normal.          Additional Data:     Lab Results:  Results from last 7 days   Lab Units 06/02/24  0101   WBC Thousand/uL 7.50   HEMOGLOBIN g/dL 15.0   HEMATOCRIT % 46.5*   PLATELETS Thousands/uL 217   SEGS PCT % 45   LYMPHO PCT % 44   MONO PCT % 7   EOS PCT % 3     Results from last 7 days   Lab Units 06/02/24  0101   SODIUM mmol/L 142   POTASSIUM mmol/L 3.7   CHLORIDE mmol/L 104   CO2 mmol/L 31   BUN mg/dL 13   CREATININE mg/dL 1.00   ANION GAP mmol/L 7   CALCIUM mg/dL 9.4   ALBUMIN g/dL 3.8   TOTAL BILIRUBIN mg/dL 0.70   ALK PHOS U/L 60   ALT U/L 6*   AST U/L 16   GLUCOSE RANDOM mg/dL 115                 Results from last 7 days   Lab Units 06/02/24  0101   PROCALCITONIN ng/ml <0.05       Lines/Drains:  Invasive Devices       Peripheral Intravenous Line  Duration             Peripheral IV 06/02/24 Left;Dorsal (posterior) Forearm <1 day                        Imaging: Reviewed radiology reports from this admission including: chest CT scan, abdominal/pelvic CT, and CT head  CT chest abdomen pelvis wo contrast   Final Result by Arya Allan MD (06/02 0522)      1.  No lobar airspace consolidation, pleural effusion, or pneumothorax. Subtle hazy groundglass opacities in the right upper lobe may reflect an early infectious/inflammatory process, recommend clinical correlation. Mild bibasilar atelectasis.   2.  Age-indeterminate superior endplate L1 compression fracture deformity with mild loss of vertebral body height is seen. Recommend correlation with physical exam findings for point tenderness in this region to help better determine acuity.   3.  No acute intra-abdominal/pelvic abnormalities noted with findings detailed above.      Workstation performed: [id number]         CT  head without contrast   Final Result by Roderick Steele MD (06/02 0455)      No acute vascular territorial infarct, hemorrhage, or focal mass effect/midline shift.      Small age-indeterminate infarcts in the bilateral basal ganglia regions. These can be further evaluated with MRI as clinically warranted.      Age-related volume loss with mild chronic microangiopathic changes within the brain.                  Workstation performed: [id number]         X-ray chest 1 view portable   ED Interpretation by Carly Zhu DO (06/02 0126)   Interstitial edema vs atypical infection      Final Result by Stephanie Mcfarlane MD (06/02 0616)      No acute cardiopulmonary disease.            Workstation performed: [id number]             EKG and Other Studies Reviewed on Admission:   EKG:  Personal interpretation-sinus bradycardia with sinus arrhythmia.  Normal QTc.  No acute ischemia.    ** Please Note: This note has been constructed using a voice recognition system. **

## 2024-06-03 VITALS
DIASTOLIC BLOOD PRESSURE: 49 MMHG | HEIGHT: 62 IN | OXYGEN SATURATION: 95 % | TEMPERATURE: 96.4 F | RESPIRATION RATE: 16 BRPM | HEART RATE: 58 BPM | BODY MASS INDEX: 24.48 KG/M2 | SYSTOLIC BLOOD PRESSURE: 102 MMHG | WEIGHT: 133 LBS

## 2024-06-03 LAB
ANION GAP SERPL CALCULATED.3IONS-SCNC: 8 MMOL/L (ref 4–13)
ATRIAL RATE: 57 BPM
ATRIAL RATE: 59 BPM
BUN SERPL-MCNC: 16 MG/DL (ref 5–25)
CALCIUM SERPL-MCNC: 9.6 MG/DL (ref 8.4–10.2)
CHLORIDE SERPL-SCNC: 103 MMOL/L (ref 96–108)
CO2 SERPL-SCNC: 30 MMOL/L (ref 21–32)
CREAT SERPL-MCNC: 0.95 MG/DL (ref 0.6–1.3)
ERYTHROCYTE [DISTWIDTH] IN BLOOD BY AUTOMATED COUNT: 12.7 % (ref 11.6–15.1)
GFR SERPL CREATININE-BSD FRML MDRD: 52 ML/MIN/1.73SQ M
GLUCOSE SERPL-MCNC: 112 MG/DL (ref 65–140)
HCT VFR BLD AUTO: 45.5 % (ref 34.8–46.1)
HGB BLD-MCNC: 15 G/DL (ref 11.5–15.4)
MCH RBC QN AUTO: 32 PG (ref 26.8–34.3)
MCHC RBC AUTO-ENTMCNC: 33 G/DL (ref 31.4–37.4)
MCV RBC AUTO: 97 FL (ref 82–98)
MRSA NOSE QL CULT: ABNORMAL
MRSA NOSE QL CULT: ABNORMAL
P AXIS: 23 DEGREES
P AXIS: 95 DEGREES
PLATELET # BLD AUTO: 227 THOUSANDS/UL (ref 149–390)
PMV BLD AUTO: 10.9 FL (ref 8.9–12.7)
POTASSIUM SERPL-SCNC: 4.1 MMOL/L (ref 3.5–5.3)
PR INTERVAL: 182 MS
PR INTERVAL: 200 MS
PROCALCITONIN SERPL-MCNC: <0.05 NG/ML
QRS AXIS: -19 DEGREES
QRS AXIS: -22 DEGREES
QRSD INTERVAL: 76 MS
QRSD INTERVAL: 82 MS
QT INTERVAL: 438 MS
QT INTERVAL: 444 MS
QTC INTERVAL: 432 MS
QTC INTERVAL: 433 MS
RBC # BLD AUTO: 4.69 MILLION/UL (ref 3.81–5.12)
S PNEUM AG UR QL: NEGATIVE
SODIUM SERPL-SCNC: 141 MMOL/L (ref 135–147)
T WAVE AXIS: 21 DEGREES
T WAVE AXIS: 4 DEGREES
VENTRICULAR RATE: 57 BPM
VENTRICULAR RATE: 59 BPM
WBC # BLD AUTO: 12.89 THOUSAND/UL (ref 4.31–10.16)

## 2024-06-03 PROCEDURE — 84145 PROCALCITONIN (PCT): CPT | Performed by: STUDENT IN AN ORGANIZED HEALTH CARE EDUCATION/TRAINING PROGRAM

## 2024-06-03 PROCEDURE — 80048 BASIC METABOLIC PNL TOTAL CA: CPT | Performed by: STUDENT IN AN ORGANIZED HEALTH CARE EDUCATION/TRAINING PROGRAM

## 2024-06-03 PROCEDURE — 85027 COMPLETE CBC AUTOMATED: CPT | Performed by: STUDENT IN AN ORGANIZED HEALTH CARE EDUCATION/TRAINING PROGRAM

## 2024-06-03 PROCEDURE — 92526 ORAL FUNCTION THERAPY: CPT

## 2024-06-03 PROCEDURE — 99239 HOSP IP/OBS DSCHRG MGMT >30: CPT | Performed by: HOSPITALIST

## 2024-06-03 PROCEDURE — 94760 N-INVAS EAR/PLS OXIMETRY 1: CPT

## 2024-06-03 PROCEDURE — 94640 AIRWAY INHALATION TREATMENT: CPT

## 2024-06-03 PROCEDURE — 93010 ELECTROCARDIOGRAM REPORT: CPT | Performed by: INTERNAL MEDICINE

## 2024-06-03 RX ORDER — LIDOCAINE 50 MG/G
3 PATCH TOPICAL DAILY
Qty: 30 PATCH | Refills: 0 | Status: SHIPPED | OUTPATIENT
Start: 2024-06-04 | End: 2024-06-03

## 2024-06-03 RX ORDER — LIDOCAINE 50 MG/G
3 PATCH TOPICAL DAILY
Qty: 30 PATCH | Refills: 0 | Status: SHIPPED | OUTPATIENT
Start: 2024-06-04

## 2024-06-03 RX ORDER — CEFPODOXIME PROXETIL 200 MG/1
200 TABLET, FILM COATED ORAL 2 TIMES DAILY
Qty: 10 TABLET | Refills: 0 | Status: SHIPPED | OUTPATIENT
Start: 2024-06-03 | End: 2024-06-08

## 2024-06-03 RX ORDER — GUAIFENESIN 600 MG/1
600 TABLET, EXTENDED RELEASE ORAL 2 TIMES DAILY
Qty: 10 TABLET | Refills: 0 | Status: SHIPPED | OUTPATIENT
Start: 2024-06-03 | End: 2024-06-03

## 2024-06-03 RX ORDER — CEFPODOXIME PROXETIL 200 MG/1
200 TABLET, FILM COATED ORAL 2 TIMES DAILY
Start: 2024-06-03 | End: 2024-06-03

## 2024-06-03 RX ORDER — LEVOTHYROXINE SODIUM 0.05 MG/1
50 TABLET ORAL DAILY
Qty: 30 TABLET | Refills: 0 | Status: SHIPPED | OUTPATIENT
Start: 2024-06-04

## 2024-06-03 RX ORDER — GUAIFENESIN 600 MG/1
600 TABLET, EXTENDED RELEASE ORAL 2 TIMES DAILY
Qty: 10 TABLET | Refills: 0 | Status: SHIPPED | OUTPATIENT
Start: 2024-06-03

## 2024-06-03 RX ORDER — LIDOCAINE 50 MG/G
3 PATCH TOPICAL DAILY
Status: DISCONTINUED | OUTPATIENT
Start: 2024-06-03 | End: 2024-06-03 | Stop reason: HOSPADM

## 2024-06-03 RX ORDER — LEVOTHYROXINE SODIUM 0.05 MG/1
50 TABLET ORAL DAILY
Qty: 30 TABLET | Refills: 0 | Status: SHIPPED | OUTPATIENT
Start: 2024-06-04 | End: 2024-06-03

## 2024-06-03 RX ADMIN — NYSTATIN: 100000 POWDER TOPICAL at 08:15

## 2024-06-03 RX ADMIN — ACETAMINOPHEN 650 MG: 325 TABLET ORAL at 09:08

## 2024-06-03 RX ADMIN — Medication 2000 UNITS: at 08:15

## 2024-06-03 RX ADMIN — LEVALBUTEROL HYDROCHLORIDE 1.25 MG: 1.25 SOLUTION RESPIRATORY (INHALATION) at 07:22

## 2024-06-03 RX ADMIN — LEVOTHYROXINE SODIUM 50 MCG: 50 TABLET ORAL at 05:48

## 2024-06-03 RX ADMIN — ENOXAPARIN SODIUM 40 MG: 40 INJECTION SUBCUTANEOUS at 08:15

## 2024-06-03 RX ADMIN — GUAIFENESIN 600 MG: 600 TABLET ORAL at 08:15

## 2024-06-03 RX ADMIN — FLUOXETINE HYDROCHLORIDE 20 MG: 20 CAPSULE ORAL at 08:15

## 2024-06-03 RX ADMIN — LIDOCAINE 5% 3 PATCH: 700 PATCH TOPICAL at 13:20

## 2024-06-03 RX ADMIN — PANTOPRAZOLE SODIUM 40 MG: 40 TABLET, DELAYED RELEASE ORAL at 08:15

## 2024-06-03 RX ADMIN — IPRATROPIUM BROMIDE 0.5 MG: 0.5 SOLUTION RESPIRATORY (INHALATION) at 07:22

## 2024-06-03 RX ADMIN — CEFTRIAXONE 2000 MG: 2 INJECTION, SOLUTION INTRAVENOUS at 05:47

## 2024-06-03 RX ADMIN — CLOPIDOGREL BISULFATE 75 MG: 75 TABLET ORAL at 08:15

## 2024-06-03 RX ADMIN — AZITHROMYCIN DIHYDRATE 500 MG: 500 TABLET ORAL at 05:48

## 2024-06-03 RX ADMIN — GABAPENTIN 100 MG: 100 CAPSULE ORAL at 08:15

## 2024-06-03 RX ADMIN — SODIUM CHLORIDE, SODIUM GLUCONATE, SODIUM ACETATE, POTASSIUM CHLORIDE, MAGNESIUM CHLORIDE, SODIUM PHOSPHATE, DIBASIC, AND POTASSIUM PHOSPHATE 100 ML/HR: .53; .5; .37; .037; .03; .012; .00082 INJECTION, SOLUTION INTRAVENOUS at 04:44

## 2024-06-03 RX ADMIN — CYANOCOBALAMIN TAB 500 MCG 1000 MCG: 500 TAB at 08:15

## 2024-06-03 NOTE — CASE MANAGEMENT
Case Management Assessment & Discharge Planning Note    Patient name Lynn Muñoz  Location /-01 MRN [id number]  : 1931 Date 6/3/2024       Current Admission Date: 2024  Current Admission Diagnosis:COPD (chronic obstructive pulmonary disease) (HCC)   Patient Active Problem List    Diagnosis Date Noted Date Diagnosed    Community acquired pneumonia of right upper lobe of lung 2024     Chronic diastolic heart failure (HCC) 2024     Hypothyroidism 2024     Acute respiratory insufficiency 2024     COPD (chronic obstructive pulmonary disease) (HCC) 2024     Hx of TIA (transient ischemic attack) and stroke 2024     A-fib (HCC) 2024     Diarrhea 2024     Dysphagia 2024       LOS (days): 1  Geometric Mean LOS (GMLOS) (days): 2.9  Days to GMLOS:1.6     OBJECTIVE:    Risk of Unplanned Readmission Score: 10.94         Current admission status: Inpatient       Preferred Pharmacy:   [id number] [place] - [address]  [address]  [place] [address]  Phone: [phone] Fax: [phone]    [facility] [place], [place] - [facility] [address] [facility] [address]  Phone: [phone] Fax: [phone]    Primary Care Provider: Laila Munroe DO    Primary Insurance: MEDICARE  Secondary Insurance: [facility]    ASSESSMENT:  Active Health Care Proxies    There are no active Health Care Proxies on file.       Advance Directives  Does patient have a Health Care POA?: Yes  Does patient have Advance Directives?: Yes  Advance Directives: Living will, Power of  for health care  Primary Contact: Eli Florian dtr         Readmission Root Cause  30 Day Readmission: No    Patient Information  Admitted from:: Facility ([facility])  Mental Status: Alert  During Assessment patient was accompanied by: Daughter  Assessment information provided by::  Patient, Daughter  Primary Caregiver: Other (Comment)  Caregiver's Name:: [facility] staff  Caregiver's Relationship to Patient:: Other (Specify) ([facility])  Caregiver's Telephone Number:: [phone]  Support Systems: Self, Daughter, Other (Comment) ([facility] staff)  County of Residence: [place]  What city do you live in?: [place]  Home entry access options. Select all that apply.: No steps to enter home  Type of Current Residence: Facility ([facility])  Upon entering residence, is there a bedroom on the main floor (no further steps)?: Yes  Upon entering residence, is there a bathroom on the main floor (no further steps)?: Yes  Living Arrangements: Other (Comment) ([facility])    Activities of Daily Living Prior to Admission  Functional Status: Assistance  Completes ADLs independently?: No  Level of ADL dependence: Assistance  Ambulates independently?: Yes  Does patient use assisted devices?: Yes  Assisted Devices (DME) used: Walker  Does patient currently own DME?: Yes  What DME does the patient currently own?: Walker  Does patient have a history of Outpatient Therapy (PT/OT)?: No  Does the patient have a history of Short-Term Rehab?: Yes  Does patient have a history of HHC?: No  Does patient currently have HHC?: No         Patient Information Continued  Income Source: Pension/assisted  Does patient have prescription coverage?: Yes  Does patient receive dialysis treatments?: No  Does patient have a history of substance abuse?: No  Does patient have a history of Mental Health Diagnosis?: No         Means of Transportation  Means of Transport to Appts:: Family transport      Social Determinants of Health (SDOH)      Flowsheet Row Most Recent Value   Housing Stability    In the last 12 months, was there a time when you were not able to pay the mortgage or rent on time? N   In the past 12 months, how many times have you moved where you were living? 1    At any time in the past 12 months, were you homeless or living in a shelter (including now)? N   Transportation Needs    In the past 12 months, has lack of transportation kept you from medical appointments or from getting medications? no   In the past 12 months, has lack of transportation kept you from meetings, work, or from getting things needed for daily living? No   Food Insecurity    Within the past 12 months, you worried that your food would run out before you got the money to buy more. Never true   Within the past 12 months, the food you bought just didn't last and you didn't have money to get more. Never true   Utilities    In the past 12 months has the electric, gas, oil, or water company threatened to shut off services in your home? No            DISCHARGE DETAILS:    Discharge planning discussed with:: pt and her dtr  Freedom of Choice: Yes     CM contacted family/caregiver?: Yes  Were Treatment Team discharge recommendations reviewed with patient/caregiver?: Yes  Did patient/caregiver verbalize understanding of patient care needs?: Yes  Were patient/caregiver advised of the risks associated with not following Treatment Team discharge recommendations?: Yes    Contacts  Patient Contacts: Eli Florian  Relationship to Patient:: Family  Contact Method: In Person  Reason/Outcome: Continuity of Care, Emergency Contact, Discharge Planning    Requested Home Health Care         Is the patient interested in HHC at discharge?: No    DME Referral Provided  Referral made for DME?: No    Other Referral/Resources/Interventions Provided:  Interventions: Assisted Living         Treatment Team Recommendation: Assisted Living  Discharge Destination Plan:: Assisted Living  Transport at Discharge : Family                                      Additional Comments: CM spoke with pt and dtr at bedside about role of CM and any needs prior to discharge. Pt resides at [facility]. Assist w/ ADLs and owns/uses RW,  wc for longer distances. Hx STR. No hx HH/MH/DA/OP PT. [facility]. Dtr will transport at discharge. CM faxed AVS to [address] and notified Shu of 230p transport time.

## 2024-06-03 NOTE — DISCHARGE SUMMARY
[facility]  Discharge- Lynn Muñoz 7/23/1931, 89 y.o. female MRN: [id number]  Unit/Bed#: [facility] Encounter: [id number]  Primary Care Provider: Laila Munroe DO   Date and time admitted to hospital: 6/2/2024 12:57 AM    Dysphagia  Assessment & Plan  Daughter at bedside has noted that patient has had some coughing after eating soups or some fruits  Has not yet seen speech therapy  Will consult speech therapy for evaluation  Regular diet at facility, continue for now    Diarrhea  Assessment & Plan  Patient reports 2-3 episodes of diarrhea daily for the last week, however states that it has been slowing down  If stools become watery-will check stool cultures  CT A/P without any evidence of enteritis    A-fib (HCC)  Assessment & Plan  Noted on nursing home records, however patient is not on any AC or rate controlling medications  Admission EKG showing sinus bradycardia    Hx of TIA (transient ischemic attack) and stroke  Assessment & Plan  Noted on nursing home records  Maintained on Plavix 75 Mg daily, continue    Acute respiratory insufficiency  Assessment & Plan  SpO2 88% on room air  Improved to > 92% on 2 L nasal cannula  Suspect secondary to early pneumonia  Did not desaturate on ambulation    Hypothyroidism  Assessment & Plan  Home regimen: Synthroid 25 mcg daily  TSH elevated at 10.361  Increase Synthroid to 50 mcg daily  Will need TSH in 4 to 6 weeks outpatient    Chronic diastolic heart failure (HCC)  Assessment & Plan  Wt Readings from Last 3 Encounters:   06/02/24 60.3 kg (133 lb)     Last echo June 2023: Mild LV concentric hypertrophy.  Normal LVEF 55 to 60%.  Basal posterior hypokinesis.  No significant valvular disease  Examines euvolemic on admission, not maintained on diuretics outpatient  Monitor fluid status daily  Liberalized diet    Community acquired pneumonia of right upper lobe of lung  Assessment & Plan  Presents with dyspnea and chest pressure  Troponins negative,  "suspect pleuritic pain in setting of developing pneumonia  CT chest: \"No lobar airspace consolidation, pleural effusion, or pneumothorax. Subtle hazy groundglass opacities in the right upper lobe may reflect an early infectious/inflammatory process \"  Procalcitonin negative  x 2  Continue ceftriaxone and azithromycin -> vantin at dc  History of COPD but not maintained on inhalers outpatient, will place on Xopenex/Atrovent 3 times daily due to pneumonia  Urine strep and Legionella  neg  Sputum culture and Gram stain    * COPD (chronic obstructive pulmonary disease) ([facility])  Assessment & Plan  History of COPD noted on nursing home records, however not maintained on any inhalers outpatient  No signs of active wheezing on admit, however will place on Xopenex/Atrovent 3 times daily in setting of possibly developing pneumonia with mild hypoxia  Will hold on further IV steroids at this point unless patient develops expiratory wheezing     Hospital Course:     Lynn Muñoz is a 89 y.o. female patient who originally presented to the hospital on   Admission Orders (From admission, onward)       Ordered        06/02/24 0549  INPATIENT ADMISSION  Once                         due to dyspnea found to have early versus viral pneumonia.  She improved on antibiotic therapy.  She has no respiratory complaints at the time of discharge.  She is ambulating appropriately without desaturation.  She is afebrile without medical complaints at the time of release from the hospital.  She is noted to have a L1 chronic compression fracture on imaging which was present last year as well.    Please see above list of diagnoses and related plan for additional information.     Physical Exam:    GEN: No acute distress, comfortable  HEEENT: No JVD, PERRLA, no scleral icterus  RESP: Lungs clear to auscultation bilaterally  CV: RRR, +s1/s2   ABD: SOFT NON TENDER, POSITIVE BOWEL SOUNDS, NO DISTENTION  PSYCH: CALM  NEURO: Mentation baseline, NO FOCAL " DEFICITS  SKIN: NO RASH  EXTREM: NO EDEMA    CONSULTING PROVIDERS   None    PROCEDURES PERFORMED  * No surgery found *    RADIOLOGY RESULTS  X-ray chest 1 view portable    Result Date: 6/2/2024  Narrative: XR CHEST PORTABLE INDICATION: chest pain. COMPARISON: Chest CT 6/2/2024. FINDINGS: Clear lungs. No pneumothorax or pleural effusion. Normal cardiomediastinal silhouette. Bones are unremarkable for age. Normal upper abdomen.     Impression: No acute cardiopulmonary disease. Workstation performed: [id number]     CT chest abdomen pelvis wo contrast    Result Date: 6/2/2024  Narrative: CT CHEST, ABDOMEN AND PELVIS WITHOUT IV CONTRAST INDICATION: upper abdominal pain. COMPARISON: None. TECHNIQUE: CT examination of the chest, abdomen and pelvis was performed without intravenous contrast. Multiplanar 2D reformatted images were created from the source data. This examination, like all CT scans performed in the [facility], was performed utilizing techniques to minimize radiation dose exposure, including the use of iterative reconstruction and automated exposure control. Radiation dose length product (DLP) for this visit: 720.37 mGy-cm Enteric Contrast: Not administered. FINDINGS: CHEST LUNGS: Central airways are patent. No tracheal or endobronchial lesion. No lobar airspace consolidation. Subtle hazy groundglass opacities in the right upper lobe may reflect an early infectious/inflammatory process, recommend clinical correlation. Mild bibasilar atelectasis. No suspicious pulmonary parenchymal mass. PLEURA: Unremarkable without pneumothorax or pleural effusions. HEART/GREAT VESSELS: Heart is unremarkable for patient's age. No pericardial effusions. No thoracic aortic aneurysm or intramural hematoma noted. Moderate scattered calcific atherosclerosis of the thoracoabdominal aorta and proximal branch vessels. MEDIASTINUM AND LANCE: A few nonspecific slightly prominent mediastinal lymph nodes noted measuring up  to 1.4 x 1.1 cm. The thyroid glands are grossly unremarkable. Esophagus is normal in course and caliber. No significant prevertebral soft tissue swelling.. CHEST WALL AND LOWER NECK: Unremarkable. ABDOMEN LIVER/BILIARY TREE: Unremarkable. GALLBLADDER: Post cholecystectomy. SPLEEN: Unremarkable. PANCREAS: Unremarkable. ADRENAL GLANDS: Unremarkable. KIDNEYS/URETERS: Unremarkable. No hydronephrosis. STOMACH AND BOWEL: Stomach is contracted limiting evaluation. Small and large bowel loops are normal in course and caliber without obstruction or inflammation. Terminal ileum and appendix are grossly unremarkable. Sigmoid diverticulosis without evidence for acute diverticulitis. APPENDIX: No findings to suggest appendicitis. ABDOMINOPELVIC CAVITY: No ascites. No pneumoperitoneum. No lymphadenopathy. VESSELS: Calcific atherosclerosis without abdominal aortic aneurysm. PELVIS REPRODUCTIVE ORGANS: Post hysterectomy. Hypodense structure along the left pelvic sidewall could represent residual left ovary containing cyst. URINARY BLADDER: Mildly distended and grossly unremarkable. ABDOMINAL WALL/INGUINAL REGIONS: Unremarkable. BONES: No acute fracture or suspicious osseous lesion. Multilevel degenerative changes throughout the thoracolumbar spine. Mild diffuse diminished bone mineralization. Age-indeterminate superior endplate L1 compression fracture deformity with mild loss of vertebral body height is seen. Recommend correlation with physical exam findings for point tenderness in this region to help better determine acuity.     Impression: 1.  No lobar airspace consolidation, pleural effusion, or pneumothorax. Subtle hazy groundglass opacities in the right upper lobe may reflect an early infectious/inflammatory process, recommend clinical correlation. Mild bibasilar atelectasis. 2.  Age-indeterminate superior endplate L1 compression fracture deformity with mild loss of vertebral body height is seen. Recommend correlation with  physical exam findings for point tenderness in this region to help better determine acuity. 3.  No acute intra-abdominal/pelvic abnormalities noted with findings detailed above. Workstation performed: [id number]     CT head without contrast    Result Date: 6/2/2024  Narrative: CT BRAIN - WITHOUT CONTRAST INDICATION:   weakness. COMPARISON:  None. TECHNIQUE:  CT examination of the brain was performed.  Multiplanar 2D reformatted images were created from the source data. Radiation dose length product (DLP) for this visit:  843.58 mGy-cm .  This examination, like all CT scans performed in the [facility], was performed utilizing techniques to minimize radiation dose exposure, including the use of iterative  reconstruction and automated exposure control. IMAGE QUALITY:  Diagnostic. FINDINGS: PARENCHYMA: Decreased attenuation is noted in periventricular and subcortical white matter demonstrating an appearance that is statistically most likely to represent mild microangiopathic change.   Multiple small age-indeterminate infarcts in the bilateral basal ganglia regions noted. No CT signs of acute vascular territory infarction.  No intracranial mass, mass effect or midline shift.  No acute parenchymal hemorrhage. VENTRICLES AND EXTRA-AXIAL SPACES:  Ventricles and extra-axial CSF spaces are prominent commensurate with the degree of volume loss.  No hydrocephalus.  No acute extra-axial hemorrhage. VISUALIZED ORBITS: Post bilateral ocular lens replacement. PARANASAL SINUSES: Normal visualized paranasal sinuses. CALVARIUM AND EXTRACRANIAL SOFT TISSUES:  Normal.     Impression: No acute vascular territorial infarct, hemorrhage, or focal mass effect/midline shift. Small age-indeterminate infarcts in the bilateral basal ganglia regions. These can be further evaluated with MRI as clinically warranted. Age-related volume loss with mild chronic microangiopathic changes within the brain. Workstation performed: [id number]  "      LABS  Results from last 7 days   Lab Units 06/03/24  0309 06/02/24  0101   WBC Thousand/uL 12.89* 7.50   HEMOGLOBIN g/dL 15.0 15.0   HEMATOCRIT % 45.5 46.5*   MCV fL 97 98   PLATELETS Thousands/uL 227 217     Results from last 7 days   Lab Units 06/03/24  0309 06/02/24  0101   SODIUM mmol/L 141 142   POTASSIUM mmol/L 4.1 3.7   CHLORIDE mmol/L 103 104   CO2 mmol/L 30 31   BUN mg/dL 16 13   CREATININE mg/dL 0.95 1.00   CALCIUM mg/dL 9.6 9.4   ALBUMIN g/dL  --  3.8   TOTAL BILIRUBIN mg/dL  --  0.70   ALK PHOS U/L  --  60   ALT U/L  --  6*   AST U/L  --  16   EGFR ml/min/1.73sq m 52 49   GLUCOSE RANDOM mg/dL 112 115     Results from last 7 days   Lab Units 06/02/24  0310 06/02/24  0101   HS TNI 0HR ng/L  --  8   HS TNI 2HR ng/L 6  --           Results from last 7 days   Lab Units 06/02/24  0101   D-DIMER QUANTITATIVE ug/ml FEU 0.72*             Results from last 7 days   Lab Units 06/02/24  0101   TSH 3RD GENERATON uIU/mL 10.361*     Results from last 7 days   Lab Units 06/03/24  0309   PROCALCITONIN ng/ml <0.05           Cultures:         Invalid input(s): \"URIBILINOGEN\"        Results from last 7 days   Lab Units 06/02/24  1146 06/02/24  0310   INFLUENZA A PCR   --  Negative   LEGIONELLA URINARY ANTIGEN  Negative  --    STREP PNEUMONIAE ANTIGEN, URINE  Negative  --          Condition at Discharge:  good      Discharge instructions/Information to patient and family:   See after visit summary for information provided to patient and family.      Provisions for Follow-Up Care:  See after visit summary for information related to follow-up care and any pertinent home health orders.      Disposition:     Home       Discharge Statement:  I spent 37 minutes discharging the patient. This time was spent on the day of discharge. I had direct contact with the patient on the day of discharge. Greater than 50% of the total time was spent examining patient, answering all patient questions, arranging and discussing plan of care " with patient as well as directly providing post-discharge instructions.  Additional time then spent on discharge activities.    D/w pt dtr    Discharge Medications:  See after visit summary for reconciled discharge medications provided to patient and family.      ** Please Note: This note has been constructed using a voice recognition system **

## 2024-06-03 NOTE — PLAN OF CARE
Problem: PAIN - ADULT  Goal: Verbalizes/displays adequate comfort level or baseline comfort level  Description: Interventions:  - Encourage patient to monitor pain and request assistance  - Assess pain using appropriate pain scale  - Administer analgesics based on type and severity of pain and evaluate response  - Implement non-pharmacological measures as appropriate and evaluate response  - Consider cultural and social influences on pain and pain management  - Notify physician/advanced practitioner if interventions unsuccessful or patient reports new pain  Outcome: Progressing     Problem: INFECTION - ADULT  Goal: Absence or prevention of progression during hospitalization  Description: INTERVENTIONS:  - Assess and monitor for signs and symptoms of infection  - Monitor lab/diagnostic results  - Monitor all insertion sites, i.e. indwelling lines, tubes, and drains  - Monitor endotracheal if appropriate and nasal secretions for changes in amount and color  - [place] appropriate cooling/warming therapies per order  - Administer medications as ordered  - Instruct and encourage patient and family to use good hand hygiene technique  - Identify and instruct in appropriate isolation precautions for identified infection/condition  Outcome: Progressing  Goal: Absence of fever/infection during neutropenic period  Description: INTERVENTIONS:  - Monitor WBC    Outcome: Progressing     Problem: SAFETY ADULT  Goal: Patient will remain free of falls  Description: INTERVENTIONS:  - Educate patient/family on patient safety including physical limitations  - Instruct patient to call for assistance with activity   - Consult OT/PT to assist with strengthening/mobility   - Keep Call bell within reach  - Keep bed low and locked with side rails adjusted as appropriate  - Keep care items and personal belongings within reach  - Initiate and maintain comfort rounds  - Make Fall Risk Sign visible to staff  - Offer Toileting every 2 Hours,  in advance of need  - Initiate/Maintain bed alarm  - Obtain necessary fall risk management equipment:   - Apply yellow socks and bracelet for high fall risk patients  - Consider moving patient to room near nurses station  Outcome: Progressing  Goal: Maintain or return to baseline ADL function  Description: INTERVENTIONS:  -  Assess patient's ability to carry out ADLs; assess patient's baseline for ADL function and identify physical deficits which impact ability to perform ADLs (bathing, care of mouth/teeth, toileting, grooming, dressing, etc.)  - Assess/evaluate cause of self-care deficits   - Assess range of motion  - Assess patient's mobility; develop plan if impaired  - Assess patient's need for assistive devices and provide as appropriate  - Encourage maximum independence but intervene and supervise when necessary  - Involve family in performance of ADLs  - Assess for home care needs following discharge   - Consider OT consult to assist with ADL evaluation and planning for discharge  - Provide patient education as appropriate  Outcome: Progressing  Goal: Maintains/Returns to pre admission functional level  Description: INTERVENTIONS:  - Perform AM-PAC 6 Click Basic Mobility/ Daily Activity assessment daily.  - Set and communicate daily mobility goal to care team and patient/family/caregiver.   - Collaborate with rehabilitation services on mobility goals if consulted  - Perform Range of Motion 3 times a day.  - Reposition patient every 2 hours.  - Dangle patient 3 times a day  - Stand patient 3 times a day  - Ambulate patient 3 times a day  - Out of bed to chair 3 times a day   - Out of bed for meals 3 times a day  - Out of bed for toileting  - Record patient progress and toleration of activity level   Outcome: Progressing     Problem: DISCHARGE PLANNING  Goal: Discharge to home or other facility with appropriate resources  Description: INTERVENTIONS:  - Identify barriers to discharge w/patient and caregiver  -  Arrange for needed discharge resources and transportation as appropriate  - Identify discharge learning needs (meds, wound care, etc.)  - Arrange for interpretive services to assist at discharge as needed  - Refer to Case Management Department for coordinating discharge planning if the patient needs post-hospital services based on physician/advanced practitioner order or complex needs related to functional status, cognitive ability, or social support system  Outcome: Progressing     Problem: Knowledge Deficit  Goal: Patient/family/caregiver demonstrates understanding of disease process, treatment plan, medications, and discharge instructions  Description: Complete learning assessment and assess knowledge base.  Interventions:  - Provide teaching at level of understanding  - Provide teaching via preferred learning methods  Outcome: Progressing     Problem: Prexisting or High Potential for Compromised Skin Integrity  Goal: Skin integrity is maintained or improved  Description: INTERVENTIONS:  - Identify patients at risk for skin breakdown  - Assess and monitor skin integrity  - Assess and monitor nutrition and hydration status  - Monitor labs   - Assess for incontinence   - Turn and reposition patient  - Assist with mobility/ambulation  - Relieve pressure over bony prominences  - Avoid friction and shearing  - Provide appropriate hygiene as needed including keeping skin clean and dry  - Evaluate need for skin moisturizer/barrier cream  - Collaborate with interdisciplinary team   - Patient/family teaching  - Consider wound care consult   Outcome: Progressing     Problem: Nutrition/Hydration-ADULT  Goal: Nutrient/Hydration intake appropriate for improving, restoring or maintaining nutritional needs  Description: Monitor and assess patient's nutrition/hydration status for malnutrition. Collaborate with interdisciplinary team and initiate plan and interventions as ordered.  Monitor patient's weight and dietary intake as  ordered or per policy. Utilize nutrition screening tool and intervene as necessary. Determine patient's food preferences and provide high-protein, high-caloric foods as appropriate.     INTERVENTIONS:  - Monitor oral intake, urinary output, labs, and treatment plans  - Assess nutrition and hydration status and recommend course of action  - Evaluate amount of meals eaten  - Assist patient with eating if necessary   - Allow adequate time for meals  - Recommend/ encourage appropriate diets, oral nutritional supplements, and vitamin/mineral supplements  - Order, calculate, and assess calorie counts as needed  - Recommend, monitor, and adjust tube feedings and TPN/PPN based on assessed needs  - Assess need for intravenous fluids  - Provide specific nutrition/hydration education as appropriate  - Include patient/family/caregiver in decisions related to nutrition  Outcome: Progressing

## 2024-06-03 NOTE — ASSESSMENT & PLAN NOTE
SpO2 88% on room air  Improved to > 92% on 2 L nasal cannula  Suspect secondary to early pneumonia  Did not desaturate on ambulation

## 2024-06-03 NOTE — SPEECH THERAPY NOTE
Speech Language/Pathology    Speech/Language Pathology Progress Note    Patient Name: Lynn Muñoz  Today's Date: 6/3/2024     Problem List  Principal Problem:    COPD (chronic obstructive pulmonary disease) ([facility])  Active Problems:    Community acquired pneumonia of right upper lobe of lung    Chronic diastolic heart failure ([facility])    Hypothyroidism    Acute respiratory insufficiency    Hx of TIA (transient ischemic attack) and stroke    A-fib ([facility])    Diarrhea    Dysphagia       Past Medical History  Past Medical History:   Diagnosis Date    A-fib ([facility])     Chronic GERD     COPD (chronic obstructive pulmonary disease) (HCC)     Coronary artery disease     Depression     Diabetes mellitus (HCC)     Erythema     Hypokalemia     Hypothyroidism     Idiopathic neuropathy     Insomnia     TIA (transient ischemic attack)     Vitamin deficiency         Past Surgical History  History reviewed. No pertinent surgical history.      Subjective:  Pt awake and alert, EOB.    Current Diet:  Regular/thin     Objective:  Pt seen for dx dysphagia tx f/u. Pt seen w/ mixed consistency cereal. Complete labial seal for retrieval and oral containment. Mildly prolonged mastication and bolus formation. Bolus formation is adequate. Brisk lingual transfers without residue. Swallows suspected timely. No overt s/s aspiration. Extensive education provided regarding strategies to optimize swallow safety, including limited mixed consistencies if they continue to be an issue. Further education provided on the importance of oral care to minimize risk of infection/pna. Pt appreciative of the education, denies further questions/concerns at this time.     Assessment:  No overt s/s aspiration w/ mixed consistencies today.     Plan/Recommendations:  Continue current diet  Frequent/thorough oral care  Pt to be d/c, consider f/u at facility if ongoing s/s

## 2024-06-03 NOTE — OCCUPATIONAL THERAPY NOTE
Occupational Therapy Screen    Patient Name: Lynn Muñoz  Today's Date: 6/3/2024     06/03/24 1211   OT Last Visit   OT Visit Date 06/03/24   Note Type   Note type Screen   Additional Comments OT orders received and chart reviewed. Per chart, pt resides at [facility]. Spoke to ANSHUL Bunn who states pt is currently independent; ambulating in the hallway with nursing.  Recommend pt continue to be OOB for meals, ambulation to/from , perform self care tasks, and mobility in hallway with nursing.  At this time, OT recommendations at time of discharge are return to facility with 24/7 nsg care as needed. No acute OT needs identified at this time; please re-consult if OT needs arise during remainder of hospital stay.     Xochitl Benitez MS, OTR/L

## 2024-06-03 NOTE — PROGRESS NOTES
Patient SpO2 resting on room air was 96%. Patient SpO2 ambulating on room air was 90%. Patient dyspneic on exertion toward end of ambulation.

## 2024-06-03 NOTE — PLAN OF CARE
Problem: PAIN - ADULT  Goal: Verbalizes/displays adequate comfort level or baseline comfort level  Description: Interventions:  - Encourage patient to monitor pain and request assistance  - Assess pain using appropriate pain scale  - Administer analgesics based on type and severity of pain and evaluate response  - Implement non-pharmacological measures as appropriate and evaluate response  - Consider cultural and social influences on pain and pain management  - Notify physician/advanced practitioner if interventions unsuccessful or patient reports new pain  Outcome: Progressing     Problem: INFECTION - ADULT  Goal: Absence or prevention of progression during hospitalization  Description: INTERVENTIONS:  - Assess and monitor for signs and symptoms of infection  - Monitor lab/diagnostic results  - Monitor all insertion sites, i.e. indwelling lines, tubes, and drains  - Monitor endotracheal if appropriate and nasal secretions for changes in amount and color  - [place] appropriate cooling/warming therapies per order  - Administer medications as ordered  - Instruct and encourage patient and family to use good hand hygiene technique  - Identify and instruct in appropriate isolation precautions for identified infection/condition  Outcome: Progressing  Goal: Absence of fever/infection during neutropenic period  Description: INTERVENTIONS:  - Monitor WBC    Outcome: Progressing     Problem: SAFETY ADULT  Goal: Patient will remain free of falls  Description: INTERVENTIONS:  - Educate patient/family on patient safety including physical limitations  - Instruct patient to call for assistance with activity   - Consult OT/PT to assist with strengthening/mobility   - Keep Call bell within reach  - Keep bed low and locked with side rails adjusted as appropriate  - Keep care items and personal belongings within reach  - Initiate and maintain comfort rounds  - Make Fall Risk Sign visible to staff  - Offer Toileting every 2 Hours,  in advance of need  - Initiate/Maintain bed alarm  - Obtain necessary fall risk management equipment:   - Apply yellow socks and bracelet for high fall risk patients  - Consider moving patient to room near nurses station  Outcome: Progressing  Goal: Maintain or return to baseline ADL function  Description: INTERVENTIONS:  -  Assess patient's ability to carry out ADLs; assess patient's baseline for ADL function and identify physical deficits which impact ability to perform ADLs (bathing, care of mouth/teeth, toileting, grooming, dressing, etc.)  - Assess/evaluate cause of self-care deficits   - Assess range of motion  - Assess patient's mobility; develop plan if impaired  - Assess patient's need for assistive devices and provide as appropriate  - Encourage maximum independence but intervene and supervise when necessary  - Involve family in performance of ADLs  - Assess for home care needs following discharge   - Consider OT consult to assist with ADL evaluation and planning for discharge  - Provide patient education as appropriate  Outcome: Progressing  Goal: Maintains/Returns to pre admission functional level  Description: INTERVENTIONS:  - Perform AM-PAC 6 Click Basic Mobility/ Daily Activity assessment daily.  - Set and communicate daily mobility goal to care team and patient/family/caregiver.   - Collaborate with rehabilitation services on mobility goals if consulted  - Perform Range of Motion 3 times a day.  - Reposition patient every 2 hours.  - Dangle patient 3 times a day  - Stand patient 3 times a day  - Ambulate patient 3 times a day  - Out of bed to chair 3 times a day   - Out of bed for meals 3 times a day  - Out of bed for toileting  - Record patient progress and toleration of activity level   Outcome: Progressing     Problem: DISCHARGE PLANNING  Goal: Discharge to home or other facility with appropriate resources  Description: INTERVENTIONS:  - Identify barriers to discharge w/patient and caregiver  -  Arrange for needed discharge resources and transportation as appropriate  - Identify discharge learning needs (meds, wound care, etc.)  - Arrange for interpretive services to assist at discharge as needed  - Refer to Case Management Department for coordinating discharge planning if the patient needs post-hospital services based on physician/advanced practitioner order or complex needs related to functional status, cognitive ability, or social support system  Outcome: Progressing     Problem: Knowledge Deficit  Goal: Patient/family/caregiver demonstrates understanding of disease process, treatment plan, medications, and discharge instructions  Description: Complete learning assessment and assess knowledge base.  Interventions:  - Provide teaching at level of understanding  - Provide teaching via preferred learning methods  Outcome: Progressing     Problem: Prexisting or High Potential for Compromised Skin Integrity  Goal: Skin integrity is maintained or improved  Description: INTERVENTIONS:  - Identify patients at risk for skin breakdown  - Assess and monitor skin integrity  - Assess and monitor nutrition and hydration status  - Monitor labs   - Assess for incontinence   - Turn and reposition patient  - Assist with mobility/ambulation  - Relieve pressure over bony prominences  - Avoid friction and shearing  - Provide appropriate hygiene as needed including keeping skin clean and dry  - Evaluate need for skin moisturizer/barrier cream  - Collaborate with interdisciplinary team   - Patient/family teaching  - Consider wound care consult   Outcome: Progressing     Problem: Nutrition/Hydration-ADULT  Goal: Nutrient/Hydration intake appropriate for improving, restoring or maintaining nutritional needs  Description: Monitor and assess patient's nutrition/hydration status for malnutrition. Collaborate with interdisciplinary team and initiate plan and interventions as ordered.  Monitor patient's weight and dietary intake as  ordered or per policy. Utilize nutrition screening tool and intervene as necessary. Determine patient's food preferences and provide high-protein, high-caloric foods as appropriate.     INTERVENTIONS:  - Monitor oral intake, urinary output, labs, and treatment plans  - Assess nutrition and hydration status and recommend course of action  - Evaluate amount of meals eaten  - Assist patient with eating if necessary   - Allow adequate time for meals  - Recommend/ encourage appropriate diets, oral nutritional supplements, and vitamin/mineral supplements  - Order, calculate, and assess calorie counts as needed  - Recommend, monitor, and adjust tube feedings and TPN/PPN based on assessed needs  - Assess need for intravenous fluids  - Provide specific nutrition/hydration education as appropriate  - Include patient/family/caregiver in decisions related to nutrition  Outcome: Progressing     Problem: Potential for Falls  Goal: Patient will remain free of falls  Description: INTERVENTIONS:  - Educate patient/family on patient safety including physical limitations  - Instruct patient to call for assistance with activity   - Consult OT/PT to assist with strengthening/mobility   - Keep Call bell within reach  - Keep bed low and locked with side rails adjusted as appropriate  - Keep care items and personal belongings within reach  - Initiate and maintain comfort rounds  - Make Fall Risk Sign visible to staff  - Offer Toileting every 2 Hours, in advance of need  - Initiate/Maintain bed alarm  - Obtain necessary fall risk management equipment:   - Apply yellow socks and bracelet for high fall risk patients  - Consider moving patient to room near nurses station  Outcome: Progressing      not motivated to quit

## 2024-06-03 NOTE — UTILIZATION REVIEW
Initial Clinical Review    Admission: Date/Time/Statement:   Admission Orders (From admission, onward)       Ordered        06/02/24 0549  INPATIENT ADMISSION  Once                          Orders Placed This Encounter   Procedures    INPATIENT ADMISSION     Standing Status:   Standing     Number of Occurrences:   1     Order Specific Question:   Level of Care     Answer:   Med Surg [16]     Order Specific Question:   Estimated length of stay     Answer:   More than 2 Midnights     Order Specific Question:   Certification     Answer:   I certify that inpatient services are medically necessary for this patient for a duration of greater than two midnights. See H&P and MD Progress Notes for additional information about the patient's course of treatment.     ED Arrival Information       Expected   -    Arrival   6/2/2024 00:56    Acuity   Urgent              Means of arrival   Ambulance    Escorted by   [facility]    Service   Hospitalist    Admission type   Emergency              Arrival complaint   Bleeding             Chief Complaint   Patient presents with    Chest Pain     EMS brought pt in from [employer] [facility] woke up with chest pressure.        Initial Presentation: 89 y.o. female to ED with left arm pain and shortness of breath. Per pt, she was having a nosebleed that caused her to then spit up blood. This then resolved but then she began to have left arm pain that radiated to her left jaw. Also c/o dyspnea but states that this not change from baseline. Pt reports 2-3 episodes of diarrhea daily for the last week, however states that it has been slowing down PMH for COPD, TIA, A-fib, GERD, and diastolic heart failure. Hypothyroidism.  Admit to Inpatient Dx; Community acquired pneumonia of right upper lobe of lung. Acute respiratory insufficiency. Diarrhea. Dysphagia. SpO2 88% on room air. Placed on O2 2L NC to maintain >92%. Weam O2 as able. Iv antibiotics. Urine strep and Legionella studies. Sputum  "culture and Gram stain. TSH elevated at 10.361. Increase Synthroid to 50 mcg daily. Speech therapy.   CT chest: \"No lobar airspace consolidation, pleural effusion, or pneumothorax. Subtle hazy groundglass opacities in the right upper lobe may reflect an early infectious/inflammatory process \"    Anticipated Length of Stay/Certification Statement: Patient will be admitted on an inpatient basis with an anticipated length of stay of greater than 2 midnights secondary to community-acquired pneumonia, acute respiratory insufficiency     Date: 6/3   Day 2:   Dyspnea, found to have early vs viral pneumonia. Pt improved on antibiotic therapy. Regular diet at facility, continue for now.  Discharged today       ED Triage Vitals   Temperature Pulse Respirations Blood Pressure SpO2   06/02/24 0057 06/02/24 0057 06/02/24 0057 06/02/24 0057 06/02/24 0057   98.6 °F (37 °C) 62 20 131/61 96 %      Temp Source Heart Rate Source Patient Position - Orthostatic VS BP Location FiO2 (%)   06/02/24 0057 06/02/24 0057 06/02/24 0057 06/02/24 0057 --   Oral Monitor Lying Right arm       Pain Score       06/02/24 0400       No Pain          Wt Readings from Last 1 Encounters:   06/02/24 60.3 kg (133 lb)     Additional Vital Signs:   06/03/24 07:34:27 96.4 °F (35.8 °C) Abnormal  58 16 102/49 Abnormal  67 100 % 28 2 L/min Nasal cannula Lying   06/03/24 0722 -- -- -- -- -- 95 % -- -- None (Room air) --   06/03/24 03:12:28 -- 64 -- 95/52 66 94 % -- -- -- --   06/02/24 23:42:49 -- 77 -- 97/48 Abnormal  64 Abnormal  94 % -- -- -- --   06/02/24 20:52:23 -- 87 -- 95/50 65 93 % -- -- -- --   06/02/24 20:43:30 96.6 °F (35.9 °C) Abnormal  88 18 95/46 Abnormal  62 Abnormal  95 % 28 2 L/min Nasal cannula Lying   06/02/24 1948 -- 83 18 -- -- 93 % 28 2 L/min Nasal cannula --   06/02/24 1500 -- -- -- -- -- 93 % 28 2 L/min Nasal cannula --   06/02/24 14:33:22 96.6 °F (35.9 °C) Abnormal  74 16 99/73 82 91 % -- -- -- --   06/02/24 0840 -- -- -- -- -- 92 % 28 2 " L/min Nasal cannula --   06/02/24 07:05:05 96.4 °F (35.8 °C) Abnormal  61 -- 138/71 93 89 % Abnormal  -- -- -- --     Pertinent Labs/Diagnostic Test Results:   CT chest abdomen pelvis wo contrast   Final Result by Arya Allan MD (06/02 0522)      1.  No lobar airspace consolidation, pleural effusion, or pneumothorax. Subtle hazy groundglass opacities in the right upper lobe may reflect an early infectious/inflammatory process, recommend clinical correlation. Mild bibasilar atelectasis.   2.  Age-indeterminate superior endplate L1 compression fracture deformity with mild loss of vertebral body height is seen. Recommend correlation with physical exam findings for point tenderness in this region to help better determine acuity.   3.  No acute intra-abdominal/pelvic abnormalities noted with findings detailed above.      Workstation performed: [id number]         CT head without contrast   Final Result by Roderick Steele MD (06/02 0455)      No acute vascular territorial infarct, hemorrhage, or focal mass effect/midline shift.      Small age-indeterminate infarcts in the bilateral basal ganglia regions. These can be further evaluated with MRI as clinically warranted.      Age-related volume loss with mild chronic microangiopathic changes within the brain.                  Workstation performed: [id number]         X-ray chest 1 view portable   ED Interpretation by Carly Zhu DO (06/02 0126)   Interstitial edema vs atypical infection      Final Result by Stephanie Mcfarlane MD (06/02 0616)      No acute cardiopulmonary disease.            Workstation performed: [id number]           Results from last 7 days   Lab Units 06/02/24  0310   SARS-COV-2  Negative     Results from last 7 days   Lab Units 06/03/24  0309 06/02/24  0101   WBC Thousand/uL 12.89* 7.50   HEMOGLOBIN g/dL 15.0 15.0   HEMATOCRIT % 45.5 46.5*   PLATELETS Thousands/uL 227 217   TOTAL NEUT ABS Thousands/µL  --  3.41         Results from last 7 days    Lab Units 06/03/24  0309 06/02/24  0101   SODIUM mmol/L 141 142   POTASSIUM mmol/L 4.1 3.7   CHLORIDE mmol/L 103 104   CO2 mmol/L 30 31   ANION GAP mmol/L 8 7   BUN mg/dL 16 13   CREATININE mg/dL 0.95 1.00   EGFR ml/min/1.73sq m 52 49   CALCIUM mg/dL 9.6 9.4     Results from last 7 days   Lab Units 06/02/24  0101   AST U/L 16   ALT U/L 6*   ALK PHOS U/L 60   TOTAL PROTEIN g/dL 6.6   ALBUMIN g/dL 3.8   TOTAL BILIRUBIN mg/dL 0.70         Results from last 7 days   Lab Units 06/03/24  0309 06/02/24  0101   GLUCOSE RANDOM mg/dL 112 115       Results from last 7 days   Lab Units 06/02/24  0310 06/02/24  0101   HS TNI 0HR ng/L  --  8   HS TNI 2HR ng/L 6  --    HSTNI D2 ng/L -2  --      Results from last 7 days   Lab Units 06/02/24  0101   D-DIMER QUANTITATIVE ug/ml FEU 0.72*         Results from last 7 days   Lab Units 06/02/24  0101   TSH 3RD GENERATON uIU/mL 10.361*     Results from last 7 days   Lab Units 06/03/24  0309 06/02/24  0101   PROCALCITONIN ng/ml <0.05 <0.05                 Results from last 7 days   Lab Units 06/02/24  0101   BNP pg/mL 73       Results from last 7 days   Lab Units 06/02/24  0101   LIPASE u/L 28       Results from last 7 days   Lab Units 06/02/24  1146 06/02/24  0310   STREP PNEUMONIAE ANTIGEN, URINE  Negative  --    LEGIONELLA URINARY ANTIGEN  Negative  --    INFLUENZA A PCR   --  Negative   INFLUENZA B PCR   --  Negative   RSV PCR   --  Negative       ED Treatment:   Medication Administration from 06/02/2024 0055 to 06/02/2024 0615         Date/Time Order Dose Route Action     06/02/2024 0308 EDT ipratropium (ATROVENT) 0.02 % inhalation solution 0.5 mg 0.5 mg Nebulization Given     06/02/2024 0602 EDT cefTRIAXone (ROCEPHIN) IVPB (premix in dextrose) 2,000 mg 50 mL 2,000 mg Intravenous Continue to Inpatient Floor     06/02/2024 0551 EDT cefTRIAXone (ROCEPHIN) IVPB (premix in dextrose) 2,000 mg 50 mL 2,000 mg Intravenous New Bag     06/02/2024 0605 EDT azithromycin (ZITHROMAX) 500 mg in  sodium chloride 0.9% 250mL IVPB 500 mg 500 mg Intravenous New Bag     06/02/2024 0549 EDT methylPREDNISolone sodium succinate (Solu-MEDROL) injection 125 mg 125 mg Intravenous Given          Past Medical History:   Diagnosis Date    A-fib (HCC)     Chronic GERD     COPD (chronic obstructive pulmonary disease) (HCC)     Coronary artery disease     Depression     Diabetes mellitus (HCC)     Erythema     Hypokalemia     Hypothyroidism     Idiopathic neuropathy     Insomnia     TIA (transient ischemic attack)     Vitamin deficiency      Present on Admission:  **None**      Admitting Diagnosis: Chest pain [R07.9]  Pneumonia [J18.9]  COPD exacerbation (HCC) [J44.1]  Age/Sex: 89 y.o. female    Admission Orders:  Scheduled Medications:    cefTRIAXone (ROCEPHIN) IVPB (premix in dextrose) 2,000 mg 50 mL  Dose: 2,000 mg  Freq: Every 24 hours Route: IV  Last Dose: 2,000 mg (06/03/24 0547)  Start: 06/03/24 0600 End: 06/03/24 1613    azithromycin (ZITHROMAX) tablet 500 mg  Dose: 500 mg  Freq: Every 24 hours Route: PO  Start: 06/03/24 0700 End: 06/03/24 1613    Cholecalciferol (VITAMIN D3) tablet 2,000 Units  Dose: 2,000 Units  Freq: Daily Route: PO  Start: 06/02/24 0900 End: 06/03/24 1613    clopidogrel (PLAVIX) tablet 75 mg  Dose: 75 mg  Freq: Daily Route: PO  Start: 06/02/24 0900 End: 06/03/24 1613    cyanocobalamin (VITAMIN B-12) tablet 1,000 mcg  Dose: 1,000 mcg  Freq: Daily Route: PO  Start: 06/02/24 0900 End: 06/03/24 1613    enoxaparin (LOVENOX) subcutaneous injection 40 mg  Dose: 40 mg  Freq: Daily Route: SC  Start: 06/02/24 0900 End: 06/03/24 1613    FLUoxetine (PROzac) capsule 20 mg  Dose: 20 mg  Freq: Daily Route: PO  Start: 06/02/24 0900 End: 06/03/24 1613    gabapentin (NEURONTIN) capsule 100 mg  Dose: 100 mg  Freq: Daily Route: PO  Start: 06/02/24 0900 End: 06/03/24 1613    ipratropium (ATROVENT) 0.02 % inhalation solution 0.5 mg  Dose: 0.5 mg  Freq: 3 times daily (RESP) Route: NEBULIZATION  Start: 06/02/24 0800  End: 06/03/24 1613      ipratropium (ATROVENT) 0.02 % inhalation solution 0.5 mg  Dose: 0.5 mg  Freq: Once Route: NEBULIZATION  Start: 06/02/24 0245 End: 06/02/24 0308    levalbuterol (XOPENEX) inhalation solution 1.25 mg  Dose: 1.25 mg  Freq: 3 times daily (RESP) Route: NEBULIZATION  Start: 06/02/24 0800 End: 06/03/24 1613    levothyroxine tablet 50 mcg  Dose: 50 mcg  Freq: Daily Route: PO  Start: 06/02/24 0700 End: 06/03/24 1613    lidocaine (LIDODERM) 5 % patch 3 patch  Dose: 3 patch  Freq: Daily Route: TP  Start: 06/03/24 1315 End: 06/03/24 1613    Continuous IV Infusions:  multi-electrolyte (PLASMALYTE-A/ISOLYTE-S PH 7.4) IV solution  Rate: 100 mL/hr Dose: 100 mL/hr  Freq: Continuous Route: IV  Last Dose: 100 mL/hr (06/03/24 0444)  Start: 06/02/24 2300 End: 06/03/24 1613    PRN Meds:  acetaminophen (TYLENOL) tablet 650 mg  Dose: 650 mg  Freq: Every 6 hours PRN Route: PO  PRN Reasons: fever,headaches,mild pain  Indications of Use: FEVER  Start: 06/02/24 0656 End: 06/03/24 1613    aluminum-magnesium hydroxide-simethicone (MAALOX) oral suspension 30 mL  Dose: 30 mL  Freq: Every 6 hours PRN Route: PO  PRN Reasons: indigestion,heartburn  Start: 06/02/24 0656 End: 06/03/24 1613    Network Utilization Review Department  ATTENTION: Please call with any questions or concerns to [phone] and carefully listen to the prompts so that you are directed to the right person. All voicemails are confidential.   For Discharge needs, contact Care Management DC Support Team at [phone] opt. 2  Send all requests for admission clinical reviews, approved or denied determinations and any other requests to dedicated fax number below belonging to the campus where the patient is receiving treatment. List of dedicated fax numbers for the Facilities:  FACILITY NAME UR FAX NUMBER   ADMISSION DENIALS (Administrative/Medical Necessity) [phone]   DISCHARGE SUPPORT TEAM (NETWORK) [phone]   PARENT CHILD HEALTH  (Maternity/NICU/Pediatrics) [phone]   [facility] [phone]   [facility] [phone]   [facility] [phone]   [facility] [phone]   [facility] [phone]   [facility] [phone]   [facility] [phone]   [facility] [phone]   [facility] [phone]   [facility] [phone]   [facility] [phone]   [facility] [phone]

## 2024-06-03 NOTE — PHYSICAL THERAPY NOTE
Physical Therapy Screen    Patient Name: Lynn Muñoz    Today's Date: 6/3/2024     Problem List  Principal Problem:    Community acquired pneumonia of right upper lobe of lung  Active Problems:    Chronic diastolic heart failure (HCC)    Hypothyroidism    Acute respiratory insufficiency    COPD (chronic obstructive pulmonary disease) (HCC)    Hx of TIA (transient ischemic attack) and stroke    A-fib (HCC)    Diarrhea    Dysphagia       Past Medical History  Past Medical History:   Diagnosis Date    A-fib (HCC)     Chronic GERD     COPD (chronic obstructive pulmonary disease) (HCC)     Coronary artery disease     Depression     Diabetes mellitus (HCC)     Erythema     Hypokalemia     Hypothyroidism     Idiopathic neuropathy     Insomnia     TIA (transient ischemic attack)     Vitamin deficiency         Past Surgical History  History reviewed. No pertinent surgical history.        06/03/24 1217   PT Last Visit   PT Visit Date 06/03/24   Note Type   Note type Screen   Additional Comments Chart review completed. Spoke with RN, Sepideh, who reports that patient ambulated in the hallway without physical assistance. No inpatient P.T. needs. Will discharge orders.     Cecelia Hazel

## 2024-06-03 NOTE — ASSESSMENT & PLAN NOTE
"Presents with dyspnea and chest pressure  Troponins negative, suspect pleuritic pain in setting of developing pneumonia  CT chest: \"No lobar airspace consolidation, pleural effusion, or pneumothorax. Subtle hazy groundglass opacities in the right upper lobe may reflect an early infectious/inflammatory process \"  Procalcitonin negative  x 2  Continue ceftriaxone and azithromycin -> vantin at [place]  History of COPD but not maintained on inhalers outpatient, will place on Xopenex/Atrovent 3 times daily due to pneumonia  Urine strep and Legionella  neg  Sputum culture and Gram stain  "

## 2024-06-04 NOTE — UTILIZATION REVIEW
NOTIFICATION OF ADMISSION DISCHARGE   This is a Notification of Discharge from [facility]. Please be advised that this patient has been discharge from our facility. Below you will find the admission and discharge date and time including the patient’s disposition.   UTILIZATION REVIEW CONTACT:  Madison Hicks  Utilization   Network Utilization Review Department  Phone: [phone] x carefully listen to the prompts. All voicemails are confidential.  Email: [email or web address]     ADMISSION INFORMATION  PRESENTATION DATE: 6/2/2024 12:57 AM  OBERVATION ADMISSION DATE:   INPATIENT ADMISSION DATE: 6/2/24  5:49 AM   DISCHARGE DATE: 6/3/2024  2:13 PM   DISPOSITION:Home/Self Care    Network Utilization Review Department  ATTENTION: Please call with any questions or concerns to [phone] and carefully listen to the prompts so that you are directed to the right person. All voicemails are confidential.   For Discharge needs, contact Care Management DC Support Team at [phone] opt. 2  Send all requests for admission clinical reviews, approved or denied determinations and any other requests to dedicated fax number below belonging to the campus where the patient is receiving treatment. List of dedicated fax numbers for the Facilities:  FACILITY NAME UR FAX NUMBER   ADMISSION DENIALS (Administrative/Medical Necessity) [phone]   DISCHARGE SUPPORT TEAM ([facility]) [phone]   PARENT CHILD HEALTH (Maternity/NICU/Pediatrics) [phone]   [facility] [phone]   [facility] [phone]   [facility] [phone]   [facility] [phone]   [facility] [phone]   [facility] [phone]   [facility] [phone]   [facility] [phone]   [facility] [phone]   [facility] [phone]   General acute hospital [phone]   [facility] [phone]

## 2024-06-05 ENCOUNTER — TELEPHONE (OUTPATIENT)
Age: 89
End: 2024-06-05

## 2024-06-13 ENCOUNTER — TELEPHONE (OUTPATIENT)
Age: 89
End: 2024-06-13

## 2024-06-13 NOTE — TELEPHONE ENCOUNTER
With daughters assistance scheduled patient per pulm referral for 7/17 in [facility] with Dr. Mcdonald, referral attached.

## 2024-07-01 ENCOUNTER — TELEPHONE (OUTPATIENT)
Dept: PULMONOLOGY | Facility: HOSPITAL | Age: 89
End: 2024-07-01

## 2024-07-03 PROBLEM — J18.9 COMMUNITY ACQUIRED PNEUMONIA OF RIGHT UPPER LOBE OF LUNG: Status: RESOLVED | Noted: 2024-06-02 | Resolved: 2024-07-03

## 2024-07-17 ENCOUNTER — CONSULT (OUTPATIENT)
Age: 89
End: 2024-07-17
Payer: COMMERCIAL

## 2024-07-17 VITALS
HEART RATE: 71 BPM | OXYGEN SATURATION: 92 % | HEIGHT: 62 IN | TEMPERATURE: 98.4 F | DIASTOLIC BLOOD PRESSURE: 68 MMHG | BODY MASS INDEX: 24.48 KG/M2 | SYSTOLIC BLOOD PRESSURE: 118 MMHG | WEIGHT: 133 LBS

## 2024-07-17 DIAGNOSIS — R93.89 ABNORMAL CT OF THE CHEST: Primary | ICD-10-CM

## 2024-07-17 PROBLEM — J44.9 COPD (CHRONIC OBSTRUCTIVE PULMONARY DISEASE) (HCC): Status: RESOLVED | Noted: 2024-06-02 | Resolved: 2024-07-17

## 2024-07-17 PROCEDURE — 99204 OFFICE O/P NEW MOD 45 MIN: CPT | Performed by: INTERNAL MEDICINE

## 2024-07-17 NOTE — PROGRESS NOTES
Pulmonary Consultation   Lynn Muñoz 89 y.o. female MRN: [id number]  7/17/2024    Referring Physician or Provider:  Xavier Leo MD  [address] [place] [id number]       Chief Complaint:  CT chest abnormality    HPI:    89-year-old female with past medical history as below presents for evaluation of abnormal CT of the chest.  Patient states at the time of obtaining that CT of the chest she was feeling malaise and intermittent cough.  Since then, symptoms have improved.  She currently denies any fevers or chills.  No recent pulmonary illness since early June.  Her  She has no significant smoking history and denies any illicit drug use.    Exercise Tolerance: Fair.  Able to ambulate to conduct all ADLs independently.    Past Medical Hx  Past Medical History:   Diagnosis Date    A-fib (HCC)     Chronic GERD     COPD (chronic obstructive pulmonary disease) (HCC)     Coronary artery disease     Depression     Diabetes mellitus (HCC)     Erythema     Hypokalemia     Hypothyroidism     Idiopathic neuropathy     Insomnia     TIA (transient ischemic attack)     Vitamin deficiency            Past Surgical Hx  History reviewed. No pertinent surgical history.        Family Hx  History reviewed. No pertinent family history.        Occupational History:   No known previous inhalation injuries      Social History:   Social History     Socioeconomic History    Marital status:      Spouse name: Not on file    Number of children: Not on file    Years of education: Not on file    Highest education level: Not on file   Occupational History    Not on file   Tobacco Use    Smoking status: Never    Smokeless tobacco: Never   Vaping Use    Vaping status: Never Used   Substance and Sexual Activity    Alcohol use: Never    Drug use: Never    Sexual activity: Not on file   Other Topics Concern    Not on file   Social History Narrative    Not on file     Social Determinants of Health     Financial Resource Strain: Not  "on file   Food Insecurity: No Food Insecurity (6/3/2024)    Hunger Vital Sign     Worried About Running Out of Food in the Last Year: Never true     Ran Out of Food in the Last Year: Never true   Transportation Needs: No Transportation Needs (6/3/2024)    PRAPARE - Transportation     Lack of Transportation (Medical): No     Lack of Transportation (Non-Medical): No   Physical Activity: Not on file   Stress: Not on file   Social Connections: Not on file   Intimate Partner Violence: Not on file   Housing Stability: Low Risk  (6/3/2024)    Housing Stability Vital Sign     Unable to Pay for Housing in the Last Year: No     Number of Times Moved in the Last Year: 1     Homeless in the Last Year: No            Pertinent Meds  No inhaled medications      ROS:  Constitutional: - Fatigue, - chills, - fever, - weight change.   HEENT: - rhinorrhea, - sneezing, - sore throat.    Respiratory: - cough, - sputum production, - shortness of breath, - wheezing.    Cardiovascular: - chest pain,  -palpitations, - leg swelling.   Gastrointestinal: - abdominal pain, - constipation, - diarrhea, - nausea, - vomiting.   Endocrine: - cold intolerance, - heat intolerance.   Genitourinary: - dysuria.   Musculoskeletal: + Back pain.   Skin:- rash, - wound.   Allergic/Immunologic: - allergies  Neurological: - dizziness, - numbness      Vitals: Blood pressure 118/68, pulse 71, temperature 98.4 °F (36.9 °C), temperature source Tympanic, height 5' 2\" (1.575 m), weight 60.3 kg (133 lb), SpO2 92%., Body mass index is 24.33 kg/m².    Physical Exam  GEN  NAD  HEENT  ncat, non icteric, MM moist  NECK  supple, no JVD, no LAD  CV  +s1s2  PULM  CTA BL, no wrr  ABD  soft, ntnd, + BS  EXT trace lower extremity pitting edema, no cyanosis, no clubbing  NEURO  Aox3, no focal weakness    Imaging and other studies     I have personally viewed and interpreted the following studies:  CT chest 6/2/2024 shows very subtle right apical groundglass opacification with " "bilateral lower lobe bandlike linear opacification suggestive of cicatricial atelectasis      Pulmonary function testing:   No PFTs available for interpretation      Assessment:  CT chest abnormality in the setting of acute viral illness    Plan:  Based on improved clinical symptoms, I do not recommend any further imaging of the thorax at this time.  Patient has no significant smoking history.  Based on history and radiology, she is unlikely to have COPD.  I suspect the predominant cause of intermittent hypoxic respiratory failure shortness of breath is aspiration.  I recommend following with speech therapy for recommendations regarding chronic aspiration.  I do not recommend any nebulized or inhaled medications at this time.    Return visit if requested by primary care physician    Note: Portions of the record may have been created with voice recognition software. Occasional wrong word or \"sound a like\" substitutions may have occurred due to the inherent limitations of voice recognition software. Read the chart carefully and recognize, using context, where substitutions have occurred.     Roderick Mcdonald M.D.  [facility]    "

## 2024-07-23 ENCOUNTER — LAB REQUISITION (OUTPATIENT)
Dept: LAB | Facility: HOSPITAL | Age: 89
End: 2024-07-23
Payer: COMMERCIAL

## 2024-07-23 DIAGNOSIS — Z11.52 ENCOUNTER FOR SCREENING FOR COVID-19: ICD-10-CM

## 2024-07-23 PROCEDURE — 87635 SARS-COV-2 COVID-19 AMP PRB: CPT | Performed by: PHYSICIAN ASSISTANT

## 2024-07-24 LAB — SARS-COV-2 RNA RESP QL NAA+PROBE: NEGATIVE

## 2024-07-29 ENCOUNTER — APPOINTMENT (EMERGENCY)
Dept: CT IMAGING | Facility: HOSPITAL | Age: 89
End: 2024-07-29
Payer: COMMERCIAL

## 2024-07-29 ENCOUNTER — HOSPITAL ENCOUNTER (EMERGENCY)
Facility: HOSPITAL | Age: 89
Discharge: HOME | End: 2024-07-29
Attending: EMERGENCY MEDICINE
Payer: COMMERCIAL

## 2024-07-29 VITALS
HEART RATE: 66 BPM | RESPIRATION RATE: 18 BRPM | TEMPERATURE: 98 F | DIASTOLIC BLOOD PRESSURE: 63 MMHG | OXYGEN SATURATION: 97 % | SYSTOLIC BLOOD PRESSURE: 140 MMHG

## 2024-07-29 DIAGNOSIS — K59.00 CONSTIPATION: ICD-10-CM

## 2024-07-29 DIAGNOSIS — R10.9 ABDOMINAL PAIN: Primary | ICD-10-CM

## 2024-07-29 DIAGNOSIS — M79.18 MYOFASCIAL MUSCLE PAIN: ICD-10-CM

## 2024-07-29 LAB
ALBUMIN SERPL BCG-MCNC: 4.2 G/DL (ref 3.5–5)
ALP SERPL-CCNC: 100 U/L (ref 34–104)
ALT SERPL W P-5'-P-CCNC: 9 U/L (ref 7–52)
ANION GAP SERPL CALCULATED.3IONS-SCNC: 8 MMOL/L (ref 4–13)
AST SERPL W P-5'-P-CCNC: 17 U/L (ref 13–39)
BACTERIA UR QL AUTO: NORMAL /HPF
BASOPHILS # BLD AUTO: 0.05 THOUSANDS/ÂΜL (ref 0–0.1)
BASOPHILS NFR BLD AUTO: 1 % (ref 0–1)
BILIRUB SERPL-MCNC: 0.73 MG/DL (ref 0.2–1)
BILIRUB UR QL STRIP: NEGATIVE
BUN SERPL-MCNC: 16 MG/DL (ref 5–25)
CALCIUM SERPL-MCNC: 10.1 MG/DL (ref 8.4–10.2)
CHLORIDE SERPL-SCNC: 100 MMOL/L (ref 96–108)
CLARITY UR: CLEAR
CO2 SERPL-SCNC: 33 MMOL/L (ref 21–32)
COLOR UR: YELLOW
CREAT SERPL-MCNC: 1.12 MG/DL (ref 0.6–1.3)
EOSINOPHIL # BLD AUTO: 0.2 THOUSAND/ÂΜL (ref 0–0.61)
EOSINOPHIL NFR BLD AUTO: 2 % (ref 0–6)
ERYTHROCYTE [DISTWIDTH] IN BLOOD BY AUTOMATED COUNT: 12.3 % (ref 11.6–15.1)
GFR SERPL CREATININE-BSD FRML MDRD: 42 ML/MIN/1.73SQ M
GLUCOSE SERPL-MCNC: 102 MG/DL (ref 65–140)
GLUCOSE UR STRIP-MCNC: NEGATIVE MG/DL
HCT VFR BLD AUTO: 48.2 % (ref 34.8–46.1)
HGB BLD-MCNC: 15.7 G/DL (ref 11.5–15.4)
HGB UR QL STRIP.AUTO: NEGATIVE
IMM GRANULOCYTES # BLD AUTO: 0.04 THOUSAND/UL (ref 0–0.2)
IMM GRANULOCYTES NFR BLD AUTO: 0 % (ref 0–2)
KETONES UR STRIP-MCNC: NEGATIVE MG/DL
LEUKOCYTE ESTERASE UR QL STRIP: ABNORMAL
LIPASE SERPL-CCNC: 24 U/L (ref 11–82)
LYMPHOCYTES # BLD AUTO: 4.15 THOUSANDS/ÂΜL (ref 0.6–4.47)
LYMPHOCYTES NFR BLD AUTO: 41 % (ref 14–44)
MCH RBC QN AUTO: 31.5 PG (ref 26.8–34.3)
MCHC RBC AUTO-ENTMCNC: 32.6 G/DL (ref 31.4–37.4)
MCV RBC AUTO: 97 FL (ref 82–98)
MONOCYTES # BLD AUTO: 0.61 THOUSAND/ÂΜL (ref 0.17–1.22)
MONOCYTES NFR BLD AUTO: 6 % (ref 4–12)
NEUTROPHILS # BLD AUTO: 5.12 THOUSANDS/ÂΜL (ref 1.85–7.62)
NEUTS SEG NFR BLD AUTO: 50 % (ref 43–75)
NITRITE UR QL STRIP: NEGATIVE
NON-SQ EPI CELLS URNS QL MICRO: NORMAL /HPF
NRBC BLD AUTO-RTO: 0 /100 WBCS
PH UR STRIP.AUTO: 7 [PH]
PLATELET # BLD AUTO: 229 THOUSANDS/UL (ref 149–390)
PMV BLD AUTO: 10.5 FL (ref 8.9–12.7)
POTASSIUM SERPL-SCNC: 3.9 MMOL/L (ref 3.5–5.3)
PROT SERPL-MCNC: 7.4 G/DL (ref 6.4–8.4)
PROT UR STRIP-MCNC: NEGATIVE MG/DL
RBC # BLD AUTO: 4.98 MILLION/UL (ref 3.81–5.12)
RBC #/AREA URNS AUTO: NORMAL /HPF
SODIUM SERPL-SCNC: 141 MMOL/L (ref 135–147)
SP GR UR STRIP.AUTO: 1.01 (ref 1–1.03)
UROBILINOGEN UR STRIP-ACNC: <2 MG/DL
WBC # BLD AUTO: 10.17 THOUSAND/UL (ref 4.31–10.16)
WBC #/AREA URNS AUTO: NORMAL /HPF

## 2024-07-29 PROCEDURE — 99285 EMERGENCY DEPT VISIT HI MDM: CPT | Performed by: EMERGENCY MEDICINE

## 2024-07-29 PROCEDURE — 83690 ASSAY OF LIPASE: CPT | Performed by: EMERGENCY MEDICINE

## 2024-07-29 PROCEDURE — 80053 COMPREHEN METABOLIC PANEL: CPT | Performed by: EMERGENCY MEDICINE

## 2024-07-29 PROCEDURE — 74176 CT ABD & PELVIS W/O CONTRAST: CPT

## 2024-07-29 PROCEDURE — 81001 URINALYSIS AUTO W/SCOPE: CPT | Performed by: EMERGENCY MEDICINE

## 2024-07-29 PROCEDURE — 36415 COLL VENOUS BLD VENIPUNCTURE: CPT | Performed by: EMERGENCY MEDICINE

## 2024-07-29 PROCEDURE — 85025 COMPLETE CBC W/AUTO DIFF WBC: CPT | Performed by: EMERGENCY MEDICINE

## 2024-07-29 PROCEDURE — 93005 ELECTROCARDIOGRAM TRACING: CPT

## 2024-07-29 PROCEDURE — 99284 EMERGENCY DEPT VISIT MOD MDM: CPT

## 2024-07-29 RX ORDER — HYDROCODONE BITARTRATE AND ACETAMINOPHEN 5; 325 MG/1; MG/1
1 TABLET ORAL ONCE
Status: COMPLETED | OUTPATIENT
Start: 2024-07-29 | End: 2024-07-29

## 2024-07-29 RX ADMIN — SODIUM CHLORIDE 250 ML: 0.9 INJECTION, SOLUTION INTRAVENOUS at 19:41

## 2024-07-29 RX ADMIN — HYDROCODONE BITARTRATE AND ACETAMINOPHEN 1 TABLET: 5; 325 TABLET ORAL at 21:23

## 2024-07-29 NOTE — ED PROVIDER NOTES
History  Chief Complaint   Patient presents with    Abdominal Pain     Pt reports waking up with lower abd pain. -N/V/D     89-year-old female with history of CAD, atrial fibrillation, COPD, diabetes, hypothyroidism and chronic diastolic heart failure is a poor historian.  Says that she has an exacerbation of chronic neck and back pain radiating down her legs.  She also complains of lower abdominal pain that started earlier today.  It has been worse in the left lower quadrant but radiates across the lower abdomen.  No exacerbating or alleviating factors.  Denies fever, dyspnea, chest pain, palpitations.  Denies nausea vomiting diarrhea constipation and bloody stool.  Denies change in urination.  Had CT of abdomen pelvis in June of this year that showed no aneurysm.    Patient's daughter arrived.  She states the patient has had similar symptoms in the past and was seen here for that.  She does has a lot of degenerative disc disease and recently did some aerobics.  She believes that is causing her abdominal pain as well as flareup of chronic back pain.          Prior to Admission Medications   Prescriptions Last Dose Informant Patient Reported? Taking?   FLUoxetine (PROzac) 20 MG tablet   Yes No   Sig: Take 1 tablet by mouth daily   Melatonin 5 MG TABS   Yes No   Sig: Take 5 mg by mouth daily at bedtime   cholecalciferol (VITAMIN D3) 400 units tablet   Yes No   Sig: Take 2,000 Units by mouth daily   clopidogrel (PLAVIX) 75 mg tablet   Yes No   Sig: Take 1 tablet by mouth daily   gabapentin (NEURONTIN) 100 mg capsule   Yes No   Sig: Take 100 mg by mouth daily   guaiFENesin (MUCINEX) 600 mg 12 hr tablet   No No   Sig: Take 1 tablet (600 mg total) by mouth 2 (two) times a day   levothyroxine 50 mcg tablet   No No   Sig: Take 1 tablet (50 mcg total) by mouth daily Do not start before June 4, 2024.   lidocaine (LIDODERM) 5 %   No No   Sig: Apply 3 patches topically over 12 hours daily Remove & Discard patch within 12 hours  or as directed by MD Do not start before June 4, 2024.   Patient not taking: Reported on 7/17/2024   nystatin (MYCOSTATIN) powder   Yes No   Sig: Apply topically 2 (two) times a day   pantoprazole (PROTONIX) 40 mg tablet   Yes No   Sig: Take 40 mg by mouth daily   vitamin B-12 (VITAMIN B-12) 1,000 mcg tablet   Yes No   Sig: Take 1,000 mcg by mouth daily      Facility-Administered Medications: None       Past Medical History:   Diagnosis Date    A-fib (HCC)     Chronic GERD     COPD (chronic obstructive pulmonary disease) (HCC)     Coronary artery disease     Depression     Diabetes mellitus (HCC)     Erythema     Hypokalemia     Hypothyroidism     Idiopathic neuropathy     Insomnia     TIA (transient ischemic attack)     Vitamin deficiency        History reviewed. No pertinent surgical history.    History reviewed. No pertinent family history.  I have reviewed and agree with the history as documented.    E-Cigarette/Vaping    E-Cigarette Use Never User      E-Cigarette/Vaping Substances     Social History     Tobacco Use    Smoking status: Never    Smokeless tobacco: Never   Vaping Use    Vaping status: Never Used   Substance Use Topics    Alcohol use: Never    Drug use: Never       Review of Systems   Constitutional:  Negative for fever.   Respiratory:  Negative for cough and shortness of breath.    Cardiovascular:  Negative for chest pain and leg swelling.   Gastrointestinal:  Negative for blood in stool.   Musculoskeletal:  Positive for arthralgias and back pain.   Neurological:  Negative for dizziness, syncope and headaches.       Physical Exam  Physical Exam  Vitals and nursing note reviewed.   Constitutional:       Appearance: She is well-developed.   HENT:      Head: Normocephalic and atraumatic.   Eyes:      Conjunctiva/sclera: Conjunctivae normal.      Pupils: Pupils are equal, round, and reactive to light.   Cardiovascular:      Rate and Rhythm: Normal rate and regular rhythm.      Heart sounds: No murmur  heard.  Pulmonary:      Effort: Pulmonary effort is normal.      Breath sounds: Normal breath sounds.   Abdominal:      General: Bowel sounds are normal.      Palpations: Abdomen is soft.   Musculoskeletal:         General: Normal range of motion.      Cervical back: Normal range of motion and neck supple.   Skin:     General: Skin is warm and dry.      Findings: No rash.   Neurological:      Mental Status: She is alert and oriented to person, place, and time.      Cranial Nerves: No cranial nerve deficit.      Coordination: Coordination normal.      Deep Tendon Reflexes: Reflexes are normal and symmetric.         Vital Signs  ED Triage Vitals [07/29/24 1805]   Temperature Pulse Respirations Blood Pressure SpO2   98 °F (36.7 °C) 69 18 130/94 95 %      Temp Source Heart Rate Source Patient Position - Orthostatic VS BP Location FiO2 (%)   Temporal Monitor Lying Right arm --      Pain Score       7           Vitals:    07/29/24 1805 07/29/24 1950   BP: 130/94 140/63   Pulse: 69 66   Patient Position - Orthostatic VS: Lying Lying         Visual Acuity      ED Medications  Medications   sodium chloride 0.9 % bolus 250 mL (0 mL Intravenous Stopped 7/29/24 2041)   HYDROcodone-acetaminophen (NORCO) 5-325 mg per tablet 1 tablet (1 tablet Oral Given 7/29/24 2123)       Diagnostic Studies  Results Reviewed       Procedure Component Value Units Date/Time    Urine Microscopic [[id number]]  (Normal) Collected: 07/29/24 1941    Lab Status: Final result Specimen: Urine, Clean Catch Updated: 07/29/24 2055     RBC, UA None Seen /hpf      WBC, UA 2-4 /hpf      Epithelial Cells Occasional /hpf      Bacteria, UA Occasional /hpf     Comprehensive metabolic panel [[id number]]  (Abnormal) Collected: 07/29/24 1941    Lab Status: Final result Specimen: Blood from Arm, Right Updated: 07/29/24 2009     Sodium 141 mmol/L      Potassium 3.9 mmol/L      Chloride 100 mmol/L      CO2 33 mmol/L      ANION GAP 8 mmol/L      BUN 16 mg/dL      Creatinine  1.12 mg/dL      Glucose 102 mg/dL      Calcium 10.1 mg/dL      AST 17 U/L      ALT 9 U/L      Alkaline Phosphatase 100 U/L      Total Protein 7.4 g/dL      Albumin 4.2 g/dL      Total Bilirubin 0.73 mg/dL      eGFR 42 ml/min/1.73sq m     Narrative:      [employer] guidelines for Chronic Kidney Disease (CKD):     Stage 1 with normal or high GFR (GFR > 90 mL/min/1.73 square meters)    Stage 2 Mild CKD (GFR = 60-89 mL/min/1.73 square meters)    Stage 3A Moderate CKD (GFR = 45-59 mL/min/1.73 square meters)    Stage 3B Moderate CKD (GFR = 30-44 mL/min/1.73 square meters)    Stage 4 Severe CKD (GFR = 15-29 mL/min/1.73 square meters)    Stage 5 End Stage CKD (GFR <15 mL/min/1.73 square meters)  Note: GFR calculation is accurate only with a steady state creatinine    Lipase [[id number]]  (Normal) Collected: 07/29/24 1941    Lab Status: Final result Specimen: Blood from Arm, Right Updated: 07/29/24 2009     Lipase 24 u/L     UA (URINE) with reflex to Scope [[id number]]  (Abnormal) Collected: 07/29/24 1941    Lab Status: Final result Specimen: Urine, Clean Catch Updated: 07/29/24 2006     Color, UA Yellow     Clarity, UA Clear     Specific Gravity, UA 1.015     pH, UA 7.0     Leukocytes, UA Small     Nitrite, UA Negative     Protein, UA Negative mg/dl      Glucose, UA Negative mg/dl      Ketones, UA Negative mg/dl      Urobilinogen, UA <2.0 mg/dl      Bilirubin, UA Negative     Occult Blood, UA Negative    CBC and differential [[id number]]  (Abnormal) Collected: 07/29/24 1941    Lab Status: Final result Specimen: Blood from Arm, Right Updated: 07/29/24 1954     WBC 10.17 Thousand/uL      RBC 4.98 Million/uL      Hemoglobin 15.7 g/dL      Hematocrit 48.2 %      MCV 97 fL      MCH 31.5 pg      MCHC 32.6 g/dL      RDW 12.3 %      MPV 10.5 fL      Platelets 229 Thousands/uL      nRBC 0 /100 WBCs      Segmented % 50 %      Immature Grans % 0 %      Lymphocytes % 41 %      Monocytes % 6 %      Eosinophils  Relative 2 %      Basophils Relative 1 %      Absolute Neutrophils 5.12 Thousands/µL      Absolute Immature Grans 0.04 Thousand/uL      Absolute Lymphocytes 4.15 Thousands/µL      Absolute Monocytes 0.61 Thousand/µL      Eosinophils Absolute 0.20 Thousand/µL      Basophils Absolute 0.05 Thousands/µL                    CT abdomen pelvis wo contrast   Final Result by Nadia Hobson MD (07/29 2058)      No acute findings in the abdomen or pelvis within the limits of unenhanced technique. Constipation      Workstation performed: [id number]                    Procedures  ECG 12 Lead Documentation Only    Date/Time: 7/29/2024 7:29 PM    Performed by: Augie Juarez DO  Authorized by: Augie Juarez DO    ECG reviewed by me, the ED Provider: yes    Patient location:  ED  Previous ECG:     Previous ECG:  Unavailable    Comparison to cardiac monitor: No    Rate:     ECG rate assessment: normal    Rhythm:     Rhythm: sinus rhythm    Ectopy:     Ectopy: none    QRS:     QRS axis:  Left    QRS intervals:  Normal  Conduction:     Conduction: normal    ST segments:     ST segments:  Normal  T waves:     T waves: normal             ED Course                                 SBIRT 20yo+      Flowsheet Row Most Recent Value   Initial Alcohol Screen: US AUDIT-C     1. How often do you have a drink containing alcohol? 0 Filed at: 07/29/2024 1806   2. How many drinks containing alcohol do you have on a typical day you are drinking?  0 Filed at: 07/29/2024 1806   3b. FEMALE Any Age, or MALE 65+: How often do you have 4 or more drinks on one occassion? 0 Filed at: 07/29/2024 1806   Audit-C Score 0 Filed at: 07/29/2024 1806   FRANCIE: How many times in the past year have you...    Used an illegal drug or used a prescription medication for non-medical reasons? Never Filed at: 07/29/2024 1806                      Medical Decision Making  89 yo F with chronic neck and back pain c/o exacerbation of these symptoms as well as lower abdominal  pain.  Concern for colitis, diverticulitis, ischemia, appendicitis, cystitis, pyelonephritis, ureteral calculus, occult infection.  Labs, EKG, CT scan reassuring.     Daughter, at bedside, states patient's symptoms began after doing aerobic exercises. She strongly feels this is the cause of her mother's discomfort, but patient did not recall she had recently exercised.    Amount and/or Complexity of Data Reviewed  Independent Historian: guardian and EMS  External Data Reviewed: labs, radiology and notes.  Labs: ordered.  Radiology: ordered.  ECG/medicine tests: ordered and independent interpretation performed.    Risk  Prescription drug management.                 Disposition  Final diagnoses:   Abdominal pain   Constipation   Myofascial muscle pain     Time reflects when diagnosis was documented in both MDM as applicable and the Disposition within this note       Time User Action Codes Description Comment    7/29/2024  9:12 PM Augie Juarez [R10.9] Abdominal pain     7/29/2024  9:12 PM Augie Juarez [K59.00] Constipation     7/29/2024  9:12 PM Augie Juarez [M79.18] Myofascial muscle pain           ED Disposition       ED Disposition   Discharge    Condition   Stable    Date/Time   Mon Jul 29, 2024 2112    Comment   Lynn Muñoz discharge to home/self care.                   Follow-up Information       Follow up With Specialties Details Why Contact Landy Munroe, DO Internal Medicine Call  As needed [address] [facility] [address] [phone]              Discharge Medication List as of 7/29/2024  9:21 PM        CONTINUE these medications which have NOT CHANGED    Details   cholecalciferol (VITAMIN D3) 400 units tablet Take 2,000 Units by mouth daily, Historical Med      clopidogrel (PLAVIX) 75 mg tablet Take 1 tablet by mouth daily, Historical Med      FLUoxetine (PROzac) 20 MG tablet Take 1 tablet by mouth daily, Historical Med      gabapentin (NEURONTIN) 100 mg capsule Take  100 mg by mouth daily, Historical Med      guaiFENesin (MUCINEX) 600 mg 12 hr tablet Take 1 tablet (600 mg total) by mouth 2 (two) times a day, Starting Mon 6/3/2024, Normal      levothyroxine 50 mcg tablet Take 1 tablet (50 mcg total) by mouth daily Do not start before June 4, 2024., Starting Tue 6/4/2024, Normal      lidocaine (LIDODERM) 5 % Apply 3 patches topically over 12 hours daily Remove & Discard patch within 12 hours or as directed by MD Do not start before June 4, 2024., Starting Tue 6/4/2024, Normal      Melatonin 5 MG TABS Take 5 mg by mouth daily at bedtime, Historical Med      nystatin (MYCOSTATIN) powder Apply topically 2 (two) times a day, Historical Med      pantoprazole (PROTONIX) 40 mg tablet Take 40 mg by mouth daily, Historical Med      vitamin B-12 (VITAMIN B-12) 1,000 mcg tablet Take 1,000 mcg by mouth daily, Historical Med             No discharge procedures on file.    PDMP Review       None            ED Provider  Electronically Signed by             Augie Juarez DO  08/05/24 [id number]

## 2024-07-30 LAB
ATRIAL RATE: 69 BPM
P AXIS: 63 DEGREES
PR INTERVAL: 180 MS
QRS AXIS: -31 DEGREES
QRSD INTERVAL: 82 MS
QT INTERVAL: 410 MS
QTC INTERVAL: 439 MS
T WAVE AXIS: 48 DEGREES
VENTRICULAR RATE: 69 BPM

## 2024-07-30 PROCEDURE — 93010 ELECTROCARDIOGRAM REPORT: CPT | Performed by: INTERNAL MEDICINE

## 2024-07-30 NOTE — DISCHARGE INSTRUCTIONS
Today's tests are basically unremarkable.  You do have some constipation on CT scan.  Take MiraLAX as directed for constipation.    We gave you 1 Vicodin pill for tonight.  Start the pain medicine that your family doctor ordered tomorrow.  Also take Tylenol as needed.  Be sure not to take more than 4000 mg of Tylenol in 24 hours.  You can also try over-the-counter analgesic creams or ointments.    Avoid activities that cause you more pain.  Discuss getting an order for physical therapy from your PCP.

## 2025-01-16 ENCOUNTER — HOSPITAL ENCOUNTER (EMERGENCY)
Facility: HOSPITAL | Age: OVER 89
Discharge: HOME | End: 2025-01-16
Attending: EMERGENCY MEDICINE | Admitting: EMERGENCY MEDICINE

## 2025-01-16 ENCOUNTER — APPOINTMENT (EMERGENCY)
Dept: RADIOLOGY | Facility: HOSPITAL | Age: OVER 89
End: 2025-01-16

## 2025-01-16 ENCOUNTER — APPOINTMENT (EMERGENCY)
Dept: CT IMAGING | Facility: HOSPITAL | Age: OVER 89
End: 2025-01-16

## 2025-01-16 VITALS
RESPIRATION RATE: 18 BRPM | HEART RATE: 61 BPM | OXYGEN SATURATION: 92 % | DIASTOLIC BLOOD PRESSURE: 77 MMHG | TEMPERATURE: 97.8 F | SYSTOLIC BLOOD PRESSURE: 122 MMHG

## 2025-01-16 DIAGNOSIS — R10.9 ABDOMINAL PAIN: ICD-10-CM

## 2025-01-16 DIAGNOSIS — R06.02 SHORTNESS OF BREATH: ICD-10-CM

## 2025-01-16 DIAGNOSIS — R19.7 DIARRHEA: ICD-10-CM

## 2025-01-16 DIAGNOSIS — R07.9 CHEST PAIN: ICD-10-CM

## 2025-01-16 DIAGNOSIS — R11.2 NAUSEA AND VOMITING: Primary | ICD-10-CM

## 2025-01-16 DIAGNOSIS — N83.209 OVARIAN CYST: ICD-10-CM

## 2025-01-16 LAB
2HR DELTA HS TROPONIN: -1 NG/L
4HR DELTA HS TROPONIN: -1 NG/L
ALBUMIN SERPL BCG-MCNC: 4.2 G/DL (ref 3.5–5)
ALP SERPL-CCNC: 79 U/L (ref 34–104)
ALT SERPL W P-5'-P-CCNC: 15 U/L (ref 7–52)
ANION GAP SERPL CALCULATED.3IONS-SCNC: 9 MMOL/L (ref 4–13)
AST SERPL W P-5'-P-CCNC: 28 U/L (ref 13–39)
ATRIAL RATE: 70 BPM
BASOPHILS # BLD AUTO: 0.02 THOUSANDS/ΜL (ref 0–0.1)
BASOPHILS NFR BLD AUTO: 0 % (ref 0–1)
BILIRUB SERPL-MCNC: 0.96 MG/DL (ref 0.2–1)
BNP SERPL-MCNC: 64 PG/ML (ref 0–100)
BUN SERPL-MCNC: 17 MG/DL (ref 5–25)
CALCIUM SERPL-MCNC: 8.9 MG/DL (ref 8.4–10.2)
CARDIAC TROPONIN I PNL SERPL HS: 5 NG/L (ref ?–50)
CARDIAC TROPONIN I PNL SERPL HS: 5 NG/L (ref ?–50)
CARDIAC TROPONIN I PNL SERPL HS: 6 NG/L (ref ?–50)
CHLORIDE SERPL-SCNC: 102 MMOL/L (ref 96–108)
CO2 SERPL-SCNC: 26 MMOL/L (ref 21–32)
CREAT SERPL-MCNC: 0.97 MG/DL (ref 0.6–1.3)
D DIMER PPP FEU-MCNC: 0.37 UG/ML FEU
EOSINOPHIL # BLD AUTO: 0.06 THOUSAND/ΜL (ref 0–0.61)
EOSINOPHIL NFR BLD AUTO: 1 % (ref 0–6)
ERYTHROCYTE [DISTWIDTH] IN BLOOD BY AUTOMATED COUNT: 12.4 % (ref 11.6–15.1)
FLUAV AG UPPER RESP QL IA.RAPID: NEGATIVE
FLUBV AG UPPER RESP QL IA.RAPID: NEGATIVE
GFR SERPL CREATININE-BSD FRML MDRD: 50 ML/MIN/1.73SQ M
GLUCOSE SERPL-MCNC: 87 MG/DL (ref 65–140)
HCT VFR BLD AUTO: 50.2 % (ref 34.8–46.1)
HGB BLD-MCNC: 16.5 G/DL (ref 11.5–15.4)
IMM GRANULOCYTES # BLD AUTO: 0.02 THOUSAND/UL (ref 0–0.2)
IMM GRANULOCYTES NFR BLD AUTO: 0 % (ref 0–2)
LIPASE SERPL-CCNC: 22 U/L (ref 11–82)
LYMPHOCYTES # BLD AUTO: 2.03 THOUSANDS/ΜL (ref 0.6–4.47)
LYMPHOCYTES NFR BLD AUTO: 32 % (ref 14–44)
MCH RBC QN AUTO: 32 PG (ref 26.8–34.3)
MCHC RBC AUTO-ENTMCNC: 32.9 G/DL (ref 31.4–37.4)
MCV RBC AUTO: 97 FL (ref 82–98)
MONOCYTES # BLD AUTO: 0.46 THOUSAND/ΜL (ref 0.17–1.22)
MONOCYTES NFR BLD AUTO: 7 % (ref 4–12)
NEUTROPHILS # BLD AUTO: 3.74 THOUSANDS/ΜL (ref 1.85–7.62)
NEUTS SEG NFR BLD AUTO: 60 % (ref 43–75)
NRBC BLD AUTO-RTO: 0 /100 WBCS
P AXIS: 60 DEGREES
PLATELET # BLD AUTO: 184 THOUSANDS/UL (ref 149–390)
PMV BLD AUTO: 10.4 FL (ref 8.9–12.7)
POTASSIUM SERPL-SCNC: 3.8 MMOL/L (ref 3.5–5.3)
PR INTERVAL: 144 MS
PROT SERPL-MCNC: 7.4 G/DL (ref 6.4–8.4)
QRS AXIS: -30 DEGREES
QRSD INTERVAL: 86 MS
QT INTERVAL: 420 MS
QTC INTERVAL: 454 MS
RBC # BLD AUTO: 5.16 MILLION/UL (ref 3.81–5.12)
SARS-COV+SARS-COV-2 AG RESP QL IA.RAPID: NEGATIVE
SODIUM SERPL-SCNC: 137 MMOL/L (ref 135–147)
T WAVE AXIS: 44 DEGREES
TSH SERPL DL<=0.05 MIU/L-ACNC: 2.46 UIU/ML (ref 0.45–4.5)
VENTRICULAR RATE: 70 BPM
WBC # BLD AUTO: 6.33 THOUSAND/UL (ref 4.31–10.16)

## 2025-01-16 PROCEDURE — 85025 COMPLETE CBC W/AUTO DIFF WBC: CPT | Performed by: EMERGENCY MEDICINE

## 2025-01-16 PROCEDURE — 71045 X-RAY EXAM CHEST 1 VIEW: CPT

## 2025-01-16 PROCEDURE — 83690 ASSAY OF LIPASE: CPT | Performed by: EMERGENCY MEDICINE

## 2025-01-16 PROCEDURE — 93005 ELECTROCARDIOGRAM TRACING: CPT

## 2025-01-16 PROCEDURE — 93010 ELECTROCARDIOGRAM REPORT: CPT | Performed by: INTERNAL MEDICINE

## 2025-01-16 PROCEDURE — 84443 ASSAY THYROID STIM HORMONE: CPT

## 2025-01-16 PROCEDURE — 83880 ASSAY OF NATRIURETIC PEPTIDE: CPT

## 2025-01-16 PROCEDURE — 84484 ASSAY OF TROPONIN QUANT: CPT | Performed by: EMERGENCY MEDICINE

## 2025-01-16 PROCEDURE — 80053 COMPREHEN METABOLIC PANEL: CPT | Performed by: EMERGENCY MEDICINE

## 2025-01-16 PROCEDURE — 96360 HYDRATION IV INFUSION INIT: CPT

## 2025-01-16 PROCEDURE — 99285 EMERGENCY DEPT VISIT HI MDM: CPT

## 2025-01-16 PROCEDURE — 74176 CT ABD & PELVIS W/O CONTRAST: CPT

## 2025-01-16 PROCEDURE — 36415 COLL VENOUS BLD VENIPUNCTURE: CPT

## 2025-01-16 PROCEDURE — 87804 INFLUENZA ASSAY W/OPTIC: CPT | Performed by: EMERGENCY MEDICINE

## 2025-01-16 PROCEDURE — 99284 EMERGENCY DEPT VISIT MOD MDM: CPT

## 2025-01-16 PROCEDURE — 85379 FIBRIN DEGRADATION QUANT: CPT

## 2025-01-16 PROCEDURE — 87811 SARS-COV-2 COVID19 W/OPTIC: CPT | Performed by: EMERGENCY MEDICINE

## 2025-01-16 RX ADMIN — SODIUM CHLORIDE 500 ML: 0.9 INJECTION, SOLUTION INTRAVENOUS at 12:44

## 2025-01-16 RX ADMIN — IOHEXOL 50 ML: 240 INJECTION, SOLUTION INTRATHECAL; INTRAVASCULAR; INTRAVENOUS; ORAL at 14:39

## 2025-01-16 NOTE — ED NOTES
"This RN heard bed alarm alerting - this RN to bedside and patient was sitting on very edge of bed, daughter sitting in chair at bedside. This RN asked if patient needed anything, patient requesting IV to be taken out. This RN mentioned bed alarm going off and patient not sitting safely in the bed, to which daughter stated \"yeah, I keep telling her to sit down. I just keep turning it off\". Patient then sat back into bed, and bed alarm was heard reactivating. This RN explained the high importance of the utilization of bed alarm, and requested that she stop turning it off. It was explained to patient and daughter that it alerts staff to come immediately to room to assess and maintain safety and needs of patient. Patient daughter verbalized understanding.     Provider made aware of patient request to remove IV.      Susan Turner RN  01/16/25 [id number]    "

## 2025-01-16 NOTE — ED PROVIDER NOTES
Time reflects when diagnosis was documented in both MDM as applicable and the Disposition within this note       Time User Action Codes Description Comment    1/16/2025  4:21 PM Angelia Booker Add [R11.2] Nausea and vomiting     1/16/2025  4:21 PM Angelia Booker Add [R19.7] Diarrhea     1/16/2025  4:22 PM Angelia Booker Add [R10.9] Abdominal pain     1/16/2025  4:22 PM Angelia Booker Add [R06.02] Shortness of breath     1/16/2025  4:22 PM Angelia Booker Add [R07.9] Chest pain     1/16/2025 11:21 PM Angelia Booker Add [N83.209] Ovarian cyst           ED Disposition       ED Disposition   Discharge    Condition   Stable    Date/Time   Thu Jan 16, 2025  4:21 PM    Comment   Lynn Muñoz discharge to home/self care.                   Assessment & Plan       Medical Decision Making  DDx: SBO, LBO, colitis, PE  Patient reporting ongoing nausea/vomiting/diarrhea, and lower abdominal pain for three days. Notes last night felt short of breath with chest pain. Lung sounds clear. No acute findings on xray.  DDimer negative. Blood work unremarkable. No eduard. No gross electrolyte abnormality. BNP wnl. Troponin negative. Symptoms occurred 3 hours ago.  No further troponins required.  CT abdomen pelvis has no acute findings.  Patient stable for follow-up with your PCP, cardiology.  Heart score of 4.  Patient stable for follow-up.  No acute bedside telemetry today.  Patient reports that she is feeling improved after fluids, and p.o. challenge.  Reviewed supportive care with the patient.  Discussed bland diet. Patient requesting discharge.   Reviewed reasons to return to ed.  Patient verbalized understanding of diagnosis and agreement with discharge plan of care as well as understanding of reasons to return to ed.      Amount and/or Complexity of Data Reviewed  Labs: ordered. Decision-making details documented in ED Course.  Radiology: ordered and independent interpretation performed.    Risk  Prescription drug  management.        ED Course as of 01/16/25 2322   Thu Jan 16, 2025   1228 D-Dimer, Quant: 0.37       Medications   sodium chloride 0.9 % bolus 500 mL (0 mL Intravenous Stopped 1/16/25 1412)   iohexol (OMNIPAQUE) 240 MG/ML solution 50 mL (50 mL Oral Given 1/16/25 1439)       ED Risk Strat Scores   HEART Risk Score      Flowsheet Row Most Recent Value   Heart Score Risk Calculator    History 1 Filed at: 01/16/2025 1400   ECG 1 Filed at: 01/16/2025 1400   Age 2 Filed at: 01/16/2025 1400   Risk Factors 0 Filed at: 01/16/2025 1400   Troponin 0 Filed at: 01/16/2025 1400   HEART Score 4 Filed at: 01/16/2025 1400          HEART Risk Score      Flowsheet Row Most Recent Value   Heart Score Risk Calculator    History 1 Filed at: 01/16/2025 1400   ECG 1 Filed at: 01/16/2025 1400   Age 2 Filed at: 01/16/2025 1400   Risk Factors 0 Filed at: 01/16/2025 1400   Troponin 0 Filed at: 01/16/2025 1400   HEART Score 4 Filed at: 01/16/2025 1400                                Wells' Criteria for PE      Flowsheet Row Most Recent Value   Wells' Criteria for PE    Clinical signs and symptoms of DVT 0 Filed at: 01/16/2025 1225   PE is primary diagnosis or equally likely 0 Filed at: 01/16/2025 1225   HR >100 0 Filed at: 01/16/2025 1225   Immobilization at least 3 days or Surgery in the previous 4 weeks 0 Filed at: 01/16/2025 1225   Previous, objectively diagnosed PE or DVT 0 Filed at: 01/16/2025 1225   Hemoptysis 0 Filed at: 01/16/2025 1225   Malignancy with treatment within 6 months or palliative 0 Filed at: 01/16/2025 1225   Wells' Criteria Total 0 Filed at: 01/16/2025 1225                        History of Present Illness       Chief Complaint   Patient presents with    Vomiting     Pt from [facility]. Pt staff pt is lethargic, refusing to eat and is having diarrhea. Pt states that her sx started last night.        Past Medical History:   Diagnosis Date    A-fib (HCC)     Chronic GERD     COPD (chronic obstructive pulmonary  disease) (HCC)     Coronary artery disease     Depression     Diabetes mellitus (HCC)     Erythema     Hypokalemia     Hypothyroidism     Idiopathic neuropathy     Insomnia     TIA (transient ischemic attack)     Vitamin deficiency       History reviewed. No pertinent surgical history.   History reviewed. No pertinent family history.   Social History     Tobacco Use    Smoking status: Never    Smokeless tobacco: Never   Vaping Use    Vaping status: Never Used   Substance Use Topics    Alcohol use: Never    Drug use: Never      E-Cigarette/Vaping    E-Cigarette Use Never User       E-Cigarette/Vaping Substances      I have reviewed and agree with the history as documented.     Patient is a 89 yo F pmhx of chf arriving for evaluation of nausea/vomiting/diarrhea, lower abdominal pain, chest pain and shortness of breath. Patient states has had nausea, vomiting, diarrhea, and lower abdominal pain for three days with no improvement. Patient reports no appetite, not tolerating PO. Patient states every time she gets up to move she has to have a BM. Patient notes last night she had shortness of breath and chest pain, reports improved now. Patient denies blood in stool or urine. Denies urinary symptoms.         Review of Systems   Constitutional:  Positive for activity change.   HENT: Negative.     Eyes: Negative.    Respiratory:  Positive for shortness of breath.    Cardiovascular:  Positive for chest pain.   Gastrointestinal:  Positive for abdominal pain.   Endocrine: Negative.    Genitourinary: Negative.    Musculoskeletal: Negative.    Skin: Negative.    Allergic/Immunologic: Negative.    Neurological: Negative.    Hematological: Negative.    Psychiatric/Behavioral: Negative.     All other systems reviewed and are negative.          Objective       ED Triage Vitals [01/16/25 1119]   Temperature Pulse Blood Pressure Respirations SpO2 Patient Position - Orthostatic VS   97.8 °F (36.6 °C) 74 131/71 18 93 % Sitting      Temp  Source Heart Rate Source BP Location FiO2 (%) Pain Score    Oral Monitor Left arm -- No Pain      Vitals      Date and Time Temp Pulse SpO2 Resp BP Pain Score FACES Pain Rating User   01/16/25 1315 -- 61 92 % 18 122/77 -- -- [facility]   01/16/25 1245 -- 64 91 % 20 127/60 -- -- [facility]   01/16/25 1119 97.8 °F (36.6 °C) 74 93 % 18 131/71 No Pain -- RN            Physical Exam  Vitals and nursing note reviewed.   Constitutional:       Appearance: Normal appearance. She is normal weight.   HENT:      Head: Normocephalic.      Right Ear: External ear normal.      Left Ear: External ear normal.      Nose: Nose normal. No congestion or rhinorrhea.      Mouth/Throat:      Mouth: Mucous membranes are moist.      Pharynx: Oropharynx is clear.   Eyes:      Extraocular Movements: Extraocular movements intact.      Conjunctiva/sclera: Conjunctivae normal.      Pupils: Pupils are equal, round, and reactive to light.   Cardiovascular:      Rate and Rhythm: Normal rate and regular rhythm.      Pulses: Normal pulses.      Heart sounds: Normal heart sounds.   Pulmonary:      Effort: Pulmonary effort is normal.      Breath sounds: Normal breath sounds.   Abdominal:      General: Abdomen is flat. Bowel sounds are normal. There is no distension.      Palpations: Abdomen is soft.      Tenderness: There is abdominal tenderness. There is no right CVA tenderness or guarding.   Musculoskeletal:      Cervical back: Normal range of motion and neck supple.   Skin:     Capillary Refill: Capillary refill takes less than 2 seconds.   Neurological:      General: No focal deficit present.      Mental Status: She is alert and oriented to person, place, and time. Mental status is at baseline.      GCS: GCS eye subscore is 4. GCS verbal subscore is 5. GCS motor subscore is 6.   Psychiatric:         Mood and Affect: Mood normal.         Thought Content: Thought content normal.         Results Reviewed       Procedure Component Value Units Date/Time    HS Troponin  I 4hr [[id number]]  (Normal) Collected: 01/16/25 1504    Lab Status: Final result Specimen: Blood from Arm, Right Updated: 01/16/25 1536     hs TnI 4hr 5 ng/L      Delta 4hr hsTnI -1 ng/L     B-Type Natriuretic Peptide(BNP) [[id number]]  (Normal) Collected: 01/16/25 1242    Lab Status: Final result Specimen: Blood from Arm, Right Updated: 01/16/25 1407     BNP 64 pg/mL     TSH, 3rd generation with Free T4 reflex [[id number]]  (Normal) Collected: 01/16/25 1242    Lab Status: Final result Specimen: Blood from Arm, Right Updated: 01/16/25 1325     TSH 3RD GENERATON 2.455 uIU/mL     HS Troponin I 2hr [[id number]]  (Normal) Collected: 01/16/25 1242    Lab Status: Final result Specimen: Blood from Arm, Right Updated: 01/16/25 1316     hs TnI 2hr 5 ng/L      Delta 2hr hsTnI -1 ng/L     D-dimer, quantitative [[id number]]  (Normal) Collected: 01/16/25 1126    Lab Status: Final result Specimen: Blood from Arm, Left Updated: 01/16/25 1227     D-Dimer, Quant 0.37 ug/ml FEU     Narrative:      In the evaluation for possible pulmonary embolism, in the appropriate (Well's Score of 4 or less) patient, the age adjusted d-dimer cutoff for this patient can be calculated as:    Age x 0.01 (in ug/mL) for Age-adjusted D-dimer exclusion threshold for a patient over 50 years.    HS Troponin 0hr (reflex protocol) [[id number]]  (Normal) Collected: 01/16/25 1126    Lab Status: Final result Specimen: Blood from Arm, Left Updated: 01/16/25 1155     hs TnI 0hr 6 ng/L     FLU/COVID Rapid Antigen (30 min. TAT) - Preferred screening test in ED [[id number]]  (Normal) Collected: 01/16/25 1125    Lab Status: Final result Specimen: Nares from Nose Updated: 01/16/25 1149     SARS COV Rapid Antigen Negative     Influenza A Rapid Antigen Negative     Influenza B Rapid Antigen Negative    Narrative:      This test has been performed using the [employer] Gogo 2 FLU+SARS Antigen test under the Emergency Use Authorization (EUA). This test has been validated by the   and verified by the performing laboratory. The Gogo uses lateral flow immunofluorescent sandwich assay to detect SARS-COV, Influenza A and Influenza B Antigen.     The [employer] Gogo 2 SARS Antigen test does not differentiate between SARS-CoV and SARS-CoV-2.     Negative results are presumptive and may be confirmed with a molecular assay, if necessary, for patient management. Negative results do not rule out SARS-CoV-2 or influenza infection and should not be used as the sole basis for treatment or patient management decisions. A negative test result may occur if the level of antigen in a sample is below the limit of detection of this test.     Positive results are indicative of the presence of viral antigens, but do not rule out bacterial infection or co-infection with other viruses.     All test results should be used as an adjunct to clinical observations and other information available to the provider.    FOR PEDIATRIC PATIENTS - copy/paste COVID Guidelines URL to browser: [email or web address]    Comprehensive metabolic panel [[id number]] Collected: 01/16/25 1125    Lab Status: Final result Specimen: Blood from Arm, Left Updated: 01/16/25 1148     Sodium 137 mmol/L      Potassium 3.8 mmol/L      Chloride 102 mmol/L      CO2 26 mmol/L      ANION GAP 9 mmol/L      BUN 17 mg/dL      Creatinine 0.97 mg/dL      Glucose 87 mg/dL      Calcium 8.9 mg/dL      AST 28 U/L      ALT 15 U/L      Alkaline Phosphatase 79 U/L      Total Protein 7.4 g/dL      Albumin 4.2 g/dL      Total Bilirubin 0.96 mg/dL      eGFR 50 ml/min/1.73sq m     Narrative:      National Kidney Disease Foundation guidelines for Chronic Kidney Disease (CKD):     Stage 1 with normal or high GFR (GFR > 90 mL/min/1.73 square meters)    Stage 2 Mild CKD (GFR = 60-89 mL/min/1.73 square meters)    Stage 3A Moderate CKD (GFR = 45-59 mL/min/1.73 square meters)    Stage 3B Moderate CKD (GFR = 30-44  mL/min/1.73 square meters)    Stage 4 Severe CKD (GFR = 15-29 mL/min/1.73 square meters)    Stage 5 End Stage CKD (GFR <15 mL/min/1.73 square meters)  Note: GFR calculation is accurate only with a steady state creatinine    Lipase [[id number]]  (Normal) Collected: 01/16/25 1125    Lab Status: Final result Specimen: Blood from Arm, Left Updated: 01/16/25 1148     Lipase 22 u/L     CBC and differential [[id number]]  (Abnormal) Collected: 01/16/25 1125    Lab Status: Final result Specimen: Blood from Arm, Left Updated: 01/16/25 1133     WBC 6.33 Thousand/uL      RBC 5.16 Million/uL      Hemoglobin 16.5 g/dL      Hematocrit 50.2 %      MCV 97 fL      MCH 32.0 pg      MCHC 32.9 g/dL      RDW 12.4 %      MPV 10.4 fL      Platelets 184 Thousands/uL      nRBC 0 /100 WBCs      Segmented % 60 %      Immature Grans % 0 %      Lymphocytes % 32 %      Monocytes % 7 %      Eosinophils Relative 1 %      Basophils Relative 0 %      Absolute Neutrophils 3.74 Thousands/µL      Absolute Immature Grans 0.02 Thousand/uL      Absolute Lymphocytes 2.03 Thousands/µL      Absolute Monocytes 0.46 Thousand/µL      Eosinophils Absolute 0.06 Thousand/µL      Basophils Absolute 0.02 Thousands/µL             CT abdomen pelvis wo contrast   Final Interpretation by Ligia Lott MD (01/16 1515)      No acute inflammatory process in the abdomen or pelvis.      2.5 cm left ovarian cyst. No additional follow-up required as per ACR recommendations.      Workstation performed: [id number]         XR chest 1 view portable   ED Interpretation by CLARITA Montoya (01/16 1535)   No acute cardiopulmonary disease      Final Interpretation by Esthela Rand MD (01/16 1602)   No acute cardiopulmonary disease.            Workstation performed: [id number]             Procedures    ED Medication and Procedure Management   Prior to Admission Medications   Prescriptions Last Dose Informant Patient Reported? Taking?   FLUoxetine (PROzac) 20 MG tablet   Yes  No   Sig: Take 1 tablet by mouth daily   Melatonin 5 MG TABS   Yes No   Sig: Take 5 mg by mouth daily at bedtime   cholecalciferol (VITAMIN D3) 400 units tablet   Yes No   Sig: Take 2,000 Units by mouth daily   clopidogrel (PLAVIX) 75 mg tablet   Yes No   Sig: Take 1 tablet by mouth daily   gabapentin (NEURONTIN) 100 mg capsule   Yes No   Sig: Take 100 mg by mouth daily   guaiFENesin (MUCINEX) 600 mg 12 hr tablet   No No   Sig: Take 1 tablet (600 mg total) by mouth 2 (two) times a day   levothyroxine 50 mcg tablet   No No   Sig: Take 1 tablet (50 mcg total) by mouth daily Do not start before June 4, 2024.   lidocaine (LIDODERM) 5 %   No No   Sig: Apply 3 patches topically over 12 hours daily Remove & Discard patch within 12 hours or as directed by MD Do not start before June 4, 2024.   Patient not taking: Reported on 7/17/2024   nystatin (MYCOSTATIN) powder   Yes No   Sig: Apply topically 2 (two) times a day   pantoprazole (PROTONIX) 40 mg tablet   Yes No   Sig: Take 40 mg by mouth daily   vitamin B-12 (VITAMIN B-12) 1,000 mcg tablet   Yes No   Sig: Take 1,000 mcg by mouth daily      Facility-Administered Medications: None     Discharge Medication List as of 1/16/2025  4:22 PM        CONTINUE these medications which have NOT CHANGED    Details   cholecalciferol (VITAMIN D3) 400 units tablet Take 2,000 Units by mouth daily, Historical Med      clopidogrel (PLAVIX) 75 mg tablet Take 1 tablet by mouth daily, Historical Med      FLUoxetine (PROzac) 20 MG tablet Take 1 tablet by mouth daily, Historical Med      gabapentin (NEURONTIN) 100 mg capsule Take 100 mg by mouth daily, Historical Med      guaiFENesin (MUCINEX) 600 mg 12 hr tablet Take 1 tablet (600 mg total) by mouth 2 (two) times a day, Starting Mon 6/3/2024, Normal      levothyroxine 50 mcg tablet Take 1 tablet (50 mcg total) by mouth daily Do not start before June 4, 2024., Starting Tue 6/4/2024, Normal      lidocaine (LIDODERM) 5 % Apply 3 patches topically  over 12 hours daily Remove & Discard patch within 12 hours or as directed by MD Do not start before June 4, 2024., Starting Tue 6/4/2024, Normal      Melatonin 5 MG TABS Take 5 mg by mouth daily at bedtime, Historical Med      nystatin (MYCOSTATIN) powder Apply topically 2 (two) times a day, Historical Med      pantoprazole (PROTONIX) 40 mg tablet Take 40 mg by mouth daily, Historical Med      vitamin B-12 (VITAMIN B-12) 1,000 mcg tablet Take 1,000 mcg by mouth daily, Historical Med           No discharge procedures on file.  ED SEPSIS DOCUMENTATION   Time reflects when diagnosis was documented in both MDM as applicable and the Disposition within this note       Time User Action Codes Description Comment    1/16/2025  4:21 PM Angelia Booker [R11.2] Nausea and vomiting     1/16/2025  4:21 PM Angelia Booker [R19.7] Diarrhea     1/16/2025  4:22 PM Angelia Booker [R10.9] Abdominal pain     1/16/2025  4:22 PM Angelia Booker [R06.02] Shortness of breath     1/16/2025  4:22 PM Angelia Booker [R07.9] Chest pain     1/16/2025 11:21 PM Angelia Booker [N83.209] Ovarian cyst                  CLARITA Montoya  01/16/25 1618

## 2025-04-24 ENCOUNTER — APPOINTMENT (EMERGENCY)
Dept: CT IMAGING | Facility: HOSPITAL | Age: OVER 89
End: 2025-04-24
Payer: COMMERCIAL

## 2025-04-24 ENCOUNTER — HOSPITAL ENCOUNTER (EMERGENCY)
Facility: HOSPITAL | Age: OVER 89
Discharge: HOME | End: 2025-04-24
Attending: EMERGENCY MEDICINE
Payer: COMMERCIAL

## 2025-04-24 VITALS
HEART RATE: 63 BPM | SYSTOLIC BLOOD PRESSURE: 141 MMHG | TEMPERATURE: 97.7 F | OXYGEN SATURATION: 91 % | DIASTOLIC BLOOD PRESSURE: 66 MMHG | RESPIRATION RATE: 20 BRPM

## 2025-04-24 DIAGNOSIS — R53.1 GENERALIZED WEAKNESS: Primary | ICD-10-CM

## 2025-04-24 DIAGNOSIS — M19.90 ARTHRITIS: ICD-10-CM

## 2025-04-24 LAB
ANION GAP SERPL CALCULATED.3IONS-SCNC: 4 MMOL/L (ref 4–13)
APTT PPP: 25 SECONDS (ref 23–34)
ATRIAL RATE: 65 BPM
BUN SERPL-MCNC: 11 MG/DL (ref 5–25)
CALCIUM SERPL-MCNC: 9.1 MG/DL (ref 8.4–10.2)
CARDIAC TROPONIN I PNL SERPL HS: 5 NG/L (ref ?–50)
CHLORIDE SERPL-SCNC: 103 MMOL/L (ref 96–108)
CO2 SERPL-SCNC: 33 MMOL/L (ref 21–32)
CREAT SERPL-MCNC: 1.1 MG/DL (ref 0.6–1.3)
ERYTHROCYTE [DISTWIDTH] IN BLOOD BY AUTOMATED COUNT: 12.5 % (ref 11.6–15.1)
FLUAV RNA RESP QL NAA+PROBE: NEGATIVE
FLUBV RNA RESP QL NAA+PROBE: NEGATIVE
GFR SERPL CREATININE-BSD FRML MDRD: 43 ML/MIN/1.73SQ M
GLUCOSE SERPL-MCNC: 79 MG/DL (ref 65–140)
GLUCOSE SERPL-MCNC: 80 MG/DL (ref 65–140)
HCT VFR BLD AUTO: 45.8 % (ref 34.8–46.1)
HGB BLD-MCNC: 14.7 G/DL (ref 11.5–15.4)
INR PPP: 1.06 (ref 0.85–1.19)
MAGNESIUM SERPL-MCNC: 1.7 MG/DL (ref 1.9–2.7)
MCH RBC QN AUTO: 32.5 PG (ref 26.8–34.3)
MCHC RBC AUTO-ENTMCNC: 32.1 G/DL (ref 31.4–37.4)
MCV RBC AUTO: 101 FL (ref 82–98)
P AXIS: -1 DEGREES
PLATELET # BLD AUTO: 208 THOUSANDS/UL (ref 149–390)
PMV BLD AUTO: 10.5 FL (ref 8.9–12.7)
POTASSIUM SERPL-SCNC: 4 MMOL/L (ref 3.5–5.3)
PR INTERVAL: 180 MS
PROTHROMBIN TIME: 14.4 SECONDS (ref 12.3–15)
QRS AXIS: -22 DEGREES
QRSD INTERVAL: 82 MS
QT INTERVAL: 414 MS
QTC INTERVAL: 430 MS
RBC # BLD AUTO: 4.53 MILLION/UL (ref 3.81–5.12)
RSV RNA RESP QL NAA+PROBE: NEGATIVE
SARS-COV-2 RNA RESP QL NAA+PROBE: NEGATIVE
SODIUM SERPL-SCNC: 140 MMOL/L (ref 135–147)
T WAVE AXIS: 22 DEGREES
VENTRICULAR RATE: 65 BPM
WBC # BLD AUTO: 7.49 THOUSAND/UL (ref 4.31–10.16)

## 2025-04-24 PROCEDURE — 99285 EMERGENCY DEPT VISIT HI MDM: CPT

## 2025-04-24 PROCEDURE — 80048 BASIC METABOLIC PNL TOTAL CA: CPT | Performed by: EMERGENCY MEDICINE

## 2025-04-24 PROCEDURE — 84484 ASSAY OF TROPONIN QUANT: CPT | Performed by: EMERGENCY MEDICINE

## 2025-04-24 PROCEDURE — 99285 EMERGENCY DEPT VISIT HI MDM: CPT | Performed by: EMERGENCY MEDICINE

## 2025-04-24 PROCEDURE — 85730 THROMBOPLASTIN TIME PARTIAL: CPT | Performed by: EMERGENCY MEDICINE

## 2025-04-24 PROCEDURE — 83735 ASSAY OF MAGNESIUM: CPT | Performed by: EMERGENCY MEDICINE

## 2025-04-24 PROCEDURE — 93005 ELECTROCARDIOGRAM TRACING: CPT

## 2025-04-24 PROCEDURE — 70450 CT HEAD/BRAIN W/O DYE: CPT

## 2025-04-24 PROCEDURE — 36415 COLL VENOUS BLD VENIPUNCTURE: CPT | Performed by: EMERGENCY MEDICINE

## 2025-04-24 PROCEDURE — 82948 REAGENT STRIP/BLOOD GLUCOSE: CPT

## 2025-04-24 PROCEDURE — 0241U HB NFCT DS VIR RESP RNA 4 TRGT: CPT | Performed by: EMERGENCY MEDICINE

## 2025-04-24 PROCEDURE — 74176 CT ABD & PELVIS W/O CONTRAST: CPT

## 2025-04-24 PROCEDURE — 71250 CT THORAX DX C-: CPT

## 2025-04-24 PROCEDURE — 72125 CT NECK SPINE W/O DYE: CPT

## 2025-04-24 PROCEDURE — 85610 PROTHROMBIN TIME: CPT | Performed by: EMERGENCY MEDICINE

## 2025-04-24 PROCEDURE — 96365 THER/PROPH/DIAG IV INF INIT: CPT

## 2025-04-24 PROCEDURE — 85027 COMPLETE CBC AUTOMATED: CPT | Performed by: EMERGENCY MEDICINE

## 2025-04-24 RX ORDER — ACETAMINOPHEN 325 MG/1
650 TABLET ORAL ONCE
Status: COMPLETED | OUTPATIENT
Start: 2025-04-24 | End: 2025-04-24

## 2025-04-24 RX ORDER — MAGNESIUM SULFATE HEPTAHYDRATE 40 MG/ML
2 INJECTION, SOLUTION INTRAVENOUS ONCE
Status: COMPLETED | OUTPATIENT
Start: 2025-04-24 | End: 2025-04-24

## 2025-04-24 RX ADMIN — ACETAMINOPHEN 650 MG: 325 TABLET, FILM COATED ORAL at 13:33

## 2025-04-24 RX ADMIN — SODIUM CHLORIDE 500 ML: 0.9 INJECTION, SOLUTION INTRAVENOUS at 13:36

## 2025-04-24 RX ADMIN — MAGNESIUM SULFATE HEPTAHYDRATE 2 G: 40 INJECTION, SOLUTION INTRAVENOUS at 13:35

## 2025-04-24 NOTE — ED PROVIDER NOTES
Time reflects when diagnosis was documented in both MDM as applicable and the Disposition within this note       Time User Action Codes Description Comment    4/24/2025  3:35 PM Ernesto Roca [R53.1] Generalized weakness     4/24/2025  3:36 PM Ernesto Roca [M19.90] Arthritis           ED Disposition       ED Disposition   Discharge    Condition   Stable    Date/Time   Thu Apr 24, 2025  3:35 PM    Comment   Lynn Muñoz discharge to home/self care.                   Assessment & Plan       Medical Decision Making  Diff includes generalized weakness/ fatigue check for dehydration, abnl blood sugar, pain through whole body in patient with dementia - broad differential, consider viral illness, myalgia arthralgia, arthritis, staff at [facility] felt patient was less responsive today - patient admits she felt like electrocution through whole body and couldn't talk or more but now she is able to.  Not even focal deficit that would suggest stroke.  Daughter thinks probably blood sugar is lower than normal.    Amount and/or Complexity of Data Reviewed  Labs: ordered.  Radiology: ordered.    Risk  OTC drugs.  Prescription drug management.        ED Course as of 04/24/25 1615   Thu Apr 24, 2025   1326 Daughter concerned maybe low blood sugar       Medications   magnesium sulfate 2 g/50 mL IVPB (premix) 2 g (0 g Intravenous Stopped 4/24/25 1435)   sodium chloride 0.9 % bolus 500 mL (0 mL Intravenous Stopped 4/24/25 1447)   acetaminophen (TYLENOL) tablet 650 mg (650 mg Oral Given 4/24/25 1333)       ED Risk Strat Scores                    No data recorded        SBIRT 20yo+      Flowsheet Row Most Recent Value   Initial Alcohol Screen: US AUDIT-C     1. How often do you have a drink containing alcohol? 0 Filed at: 04/24/2025 1206   2. How many drinks containing alcohol do you have on a typical day you are drinking?  0 Filed at: 04/24/2025 1206   3a. Male UNDER 65: How often do you have five or more drinks on one  occasion? 0 Filed at: 04/24/2025 1206   3b. FEMALE Any Age, or MALE 65+: How often do you have 4 or more drinks on one occassion? 0 Filed at: 04/24/2025 1206   Audit-C Score 0 Filed at: 04/24/2025 1206   FRANCIE: How many times in the past year have you...    Used an illegal drug or used a prescription medication for non-medical reasons? Never Filed at: 04/24/2025 1206                            History of Present Illness       Chief Complaint   Patient presents with    Weakness - Generalized     Per roommate pt was more lethargic after breakfast this am. Pt is baseline confused. [facility] per staff 10 am. Pt reports headache and feeling more weak. Hx of TIA no deficits. In route bg 121.       Past Medical History:   Diagnosis Date    A-fib (HCC)     Chronic GERD     COPD (chronic obstructive pulmonary disease) (HCC)     Coronary artery disease     Depression     Diabetes mellitus (HCC)     Erythema     Hypokalemia     Hypothyroidism     Idiopathic neuropathy     Insomnia     TIA (transient ischemic attack)     Vitamin deficiency       History reviewed. No pertinent surgical history.   History reviewed. No pertinent family history.   Social History     Tobacco Use    Smoking status: Never    Smokeless tobacco: Never   Vaping Use    Vaping status: Never Used   Substance Use Topics    Alcohol use: Never    Drug use: Never      E-Cigarette/Vaping    E-Cigarette Use Never User       E-Cigarette/Vaping Substances      I have reviewed and agree with the history as documented.     Hx from patient, paramedics, med records - [facility] TIA, a fibb, DM, dementia - biba from [address] for decreased responsiveness change in mental status today.  Last acting nl was 10 am today.  Patient states she just couldn't move or talk because she felt electrocution type pain throughout whole body while laying in bed.  Symptoms have gradually dissipated on there own and she feels better now.  She felt tired and fatigue as well.        Review of  Systems   Constitutional:  Positive for fatigue. Negative for chills and fever.   HENT:  Negative for rhinorrhea and sore throat.    Respiratory:  Negative for shortness of breath.    Cardiovascular:  Negative for chest pain.   Gastrointestinal:  Negative for constipation, diarrhea, nausea and vomiting.   Genitourinary:  Negative for dysuria and frequency.   Musculoskeletal:  Positive for arthralgias and myalgias.   Skin:  Negative for rash.   Neurological:  Positive for dizziness, weakness and light-headedness.   All other systems reviewed and are negative.          Objective       ED Triage Vitals [04/24/25 1150]   Temperature Pulse Blood Pressure Respirations SpO2 Patient Position - Orthostatic VS   97.7 °F (36.5 °C) 66 133/67 17 91 % --      Temp Source Heart Rate Source BP Location FiO2 (%) Pain Score    Temporal Monitor -- -- --      Vitals      Date and Time Temp Pulse SpO2 Resp BP Pain Score FACES Pain Rating User   04/24/25 1430 -- 69 90 % 14 112/54 -- -- KP   04/24/25 1337 -- 70 93 % 19 125/59 -- --    04/24/25 1150 97.7 °F (36.5 °C) 66 91 % 17 133/67 -- -- LK            Physical Exam  Vitals and nursing note reviewed.   Constitutional:       Appearance: She is well-developed.   HENT:      Head: Normocephalic and atraumatic.      Right Ear: External ear normal.      Left Ear: External ear normal.      Nose: Nose normal.      Mouth/Throat:      Mouth: Mucous membranes are dry.   Eyes:      Conjunctiva/sclera: Conjunctivae normal.      Pupils: Pupils are equal, round, and reactive to light.   Cardiovascular:      Rate and Rhythm: Normal rate and regular rhythm.      Heart sounds: Normal heart sounds.   Pulmonary:      Effort: Pulmonary effort is normal. No respiratory distress.      Breath sounds: Normal breath sounds. No wheezing.   Abdominal:      General: Bowel sounds are normal. There is no distension.      Palpations: Abdomen is soft.      Tenderness: There is no abdominal tenderness.    Musculoskeletal:         General: No deformity. Normal range of motion.      Cervical back: Normal range of motion and neck supple. No spinous process tenderness.   Skin:     General: Skin is warm and dry.      Findings: No rash.   Neurological:      General: No focal deficit present.      Mental Status: She is alert. She is disoriented.      GCS: GCS eye subscore is 4. GCS verbal subscore is 5. GCS motor subscore is 6.      Cranial Nerves: Cranial nerves 2-12 are intact.      Sensory: Sensation is intact. No sensory deficit.      Motor: Motor function is intact.      Coordination: Coordination is intact.      Gait: Gait is intact.      Comments: Acting at baseline    Chronic left visual field disturbance   Psychiatric:         Mood and Affect: Mood normal.         Results Reviewed       Procedure Component Value Units Date/Time    FLU/RSV/COVID - if FLU/RSV clinically relevant (2hr TAT) [[id number]]  (Normal) Collected: 04/24/25 1221    Lab Status: Final result Specimen: Nares from Nose Updated: 04/24/25 1302     SARS-CoV-2 Negative     INFLUENZA A PCR Negative     INFLUENZA B PCR Negative     RSV PCR Negative    Narrative:      This test has been performed using the CoV-2/Flu/RSV plus assay on the [employer] platform. This test has been validated by the  and verified by the performing laboratory.     This test is designed to amplify and detect the following: nucleocapsid (N), envelope (E), and RNA-dependent RNA polymerase (RdRP) genes of the SARS-CoV-2 genome; matrix (M), basic polymerase (PB2), and acidic protein (PA) segments of the influenza A genome; matrix (M) and non-structural protein (NS) segments of the influenza B genome, and the nucleocapsid genes of RSV A and RSV B.     Positive results are indicative of the presence of Flu A, Flu B, RSV, and/or SARS-CoV-2 RNA. Positive results for SARS-CoV-2 or suspected novel influenza should be reported to state, local, or federal health  departments according to local reporting requirements.      All results should be assessed in conjunction with clinical presentation and other laboratory markers for clinical management.     FOR PEDIATRIC PATIENTS - copy/paste COVID Guidelines URL to browser: [email or web address]       HS Troponin 0hr (reflex protocol) [[id number]]  (Normal) Collected: 04/24/25 1221    Lab Status: Final result Specimen: Blood from Arm, Right Updated: 04/24/25 1250     hs TnI 0hr 5 ng/L     Protime-INR [[id number]]  (Normal) Collected: 04/24/25 1221    Lab Status: Final result Specimen: Blood from Arm, Right Updated: 04/24/25 1241     Protime 14.4 seconds      INR 1.06    Narrative:      INR Therapeutic Range    Indication                                             INR Range      Atrial Fibrillation                                               2.0-3.0  Hypercoagulable State                                    2.0.2.3  Left Ventricular Asist Device                            2.0-3.0  Mechanical Heart Valve                                  -    Aortic(with afib, MI, embolism, HF, LA enlargement,    and/or coagulopathy)                                     2.0-3.0 (2.5-3.5)     Mitral                                                             2.5-3.5  Prosthetic/Bioprosthetic Heart Valve               2.0-3.0  Venous thromboembolism (VTE: VT, PE        2.0-3.0    APTT [[id number]]  (Normal) Collected: 04/24/25 1221    Lab Status: Final result Specimen: Blood from Arm, Right Updated: 04/24/25 1241     PTT 25 seconds     Basic metabolic panel [[id number]]  (Abnormal) Collected: 04/24/25 1221    Lab Status: Final result Specimen: Blood from Arm, Right Updated: 04/24/25 1241     Sodium 140 mmol/L      Potassium 4.0 mmol/L      Chloride 103 mmol/L      CO2 33 mmol/L      ANION GAP 4 mmol/L      BUN 11 mg/dL      Creatinine 1.10 mg/dL      Glucose 80 mg/dL      Calcium 9.1 mg/dL      eGFR 43  ml/min/1.73sq m     Narrative:      National Kidney Disease Foundation guidelines for Chronic Kidney Disease (CKD):     Stage 1 with normal or high GFR (GFR > 90 mL/min/1.73 square meters)    Stage 2 Mild CKD (GFR = 60-89 mL/min/1.73 square meters)    Stage 3A Moderate CKD (GFR = 45-59 mL/min/1.73 square meters)    Stage 3B Moderate CKD (GFR = 30-44 mL/min/1.73 square meters)    Stage 4 Severe CKD (GFR = 15-29 mL/min/1.73 square meters)    Stage 5 End Stage CKD (GFR <15 mL/min/1.73 square meters)  Note: GFR calculation is accurate only with a steady state creatinine    Magnesium [[id number]]  (Abnormal) Collected: 04/24/25 1221    Lab Status: Final result Specimen: Blood from Arm, Right Updated: 04/24/25 1241     Magnesium 1.7 mg/dL     CBC and Platelet [[id number]]  (Abnormal) Collected: 04/24/25 1221    Lab Status: Final result Specimen: Blood from Arm, Right Updated: 04/24/25 1227     WBC 7.49 Thousand/uL      RBC 4.53 Million/uL      Hemoglobin 14.7 g/dL      Hematocrit 45.8 %       fL      MCH 32.5 pg      MCHC 32.1 g/dL      RDW 12.5 %      Platelets 208 Thousands/uL      MPV 10.5 fL     Fingerstick Glucose (POCT) [[id number]]  (Normal) Collected: 04/24/25 1220    Lab Status: Final result Specimen: Blood Updated: 04/24/25 1221     POC Glucose 79 mg/dl             CT chest abdomen pelvis wo contrast   Final Interpretation by Hayden Edward MD (04/24 4175)      No acute thoracic or abdominopelvic pathology.               Workstation performed: [id number]         CT head wo contrast   Final Interpretation by Hayden Edward MD (04/24 5531)      No acute intracranial abnormality.  Chronic microangiopathic changes.                  Workstation performed: [id number]         CT spine cervical without contrast   Final Interpretation by Hayden Edward MD (04/24 2812)      No cervical spine fracture or traumatic malalignment.                  Workstation performed: [id number]             ECG 12 Lead Documentation  Only    Date/Time: 4/24/2025 4:14 PM    Performed by: Ernesto Roca DO  Authorized by: Ernesto Roca DO    Indications / Diagnosis:  Generalized weakness  ECG reviewed by me, the ED Provider: yes    Patient location:  ED  Interpretation:     Interpretation: normal    Rate:     ECG rate:  65    ECG rate assessment: normal    Rhythm:     Rhythm: sinus rhythm    Ectopy:     Ectopy: none    QRS:     QRS axis:  Normal    QRS intervals:  Normal  Conduction:     Conduction: normal    ST segments:     ST segments:  Normal  T waves:     T waves: normal    Comments:      This EKG was interpreted by me.      ED Medication and Procedure Management   Prior to Admission Medications   Prescriptions Last Dose Informant Patient Reported? Taking?   FLUoxetine (PROzac) 20 MG tablet   Yes No   Sig: Take 1 tablet by mouth daily   Melatonin 5 MG TABS   Yes No   Sig: Take 5 mg by mouth daily at bedtime   cholecalciferol (VITAMIN D3) 400 units tablet   Yes No   Sig: Take 2,000 Units by mouth daily   clopidogrel (PLAVIX) 75 mg tablet   Yes No   Sig: Take 1 tablet by mouth daily   gabapentin (NEURONTIN) 100 mg capsule   Yes No   Sig: Take 100 mg by mouth daily   guaiFENesin (MUCINEX) 600 mg 12 hr tablet   No No   Sig: Take 1 tablet (600 mg total) by mouth 2 (two) times a day   levothyroxine 50 mcg tablet   No No   Sig: Take 1 tablet (50 mcg total) by mouth daily Do not start before June 4, 2024.   lidocaine (LIDODERM) 5 %   No No   Sig: Apply 3 patches topically over 12 hours daily Remove & Discard patch within 12 hours or as directed by MD Do not start before June 4, 2024.   Patient not taking: Reported on 7/17/2024   nystatin (MYCOSTATIN) powder   Yes No   Sig: Apply topically 2 (two) times a day   pantoprazole (PROTONIX) 40 mg tablet   Yes No   Sig: Take 40 mg by mouth daily   vitamin B-12 (VITAMIN B-12) 1,000 mcg tablet   Yes No   Sig: Take 1,000 mcg by mouth daily      Facility-Administered Medications: None     Current Discharge  Medication List        CONTINUE these medications which have NOT CHANGED    Details   cholecalciferol (VITAMIN D3) 400 units tablet Take 2,000 Units by mouth daily      clopidogrel (PLAVIX) 75 mg tablet Take 1 tablet by mouth daily      FLUoxetine (PROzac) 20 MG tablet Take 1 tablet by mouth daily      gabapentin (NEURONTIN) 100 mg capsule Take 100 mg by mouth daily      guaiFENesin (MUCINEX) 600 mg 12 hr tablet Take 1 tablet (600 mg total) by mouth 2 (two) times a day  Qty: 10 tablet, Refills: 0    Associated Diagnoses: COPD exacerbation (HCC)      levothyroxine 50 mcg tablet Take 1 tablet (50 mcg total) by mouth daily Do not start before June 4, 2024.  Qty: 30 tablet, Refills: 0    Associated Diagnoses: Hypothyroid      lidocaine (LIDODERM) 5 % Apply 3 patches topically over 12 hours daily Remove & Discard patch within 12 hours or as directed by MD Do not start before June 4, 2024.  Qty: 30 patch, Refills: 0    Associated Diagnoses: Chest pain      Melatonin 5 MG TABS Take 5 mg by mouth daily at bedtime      nystatin (MYCOSTATIN) powder Apply topically 2 (two) times a day      pantoprazole (PROTONIX) 40 mg tablet Take 40 mg by mouth daily      vitamin B-12 (VITAMIN B-12) 1,000 mcg tablet Take 1,000 mcg by mouth daily           No discharge procedures on file.  ED SEPSIS DOCUMENTATION   Time reflects when diagnosis was documented in both MDM as applicable and the Disposition within this note       Time User Action Codes Description Comment    4/24/2025  3:35 PM Ernesto Roca [R53.1] Generalized weakness     4/24/2025  3:36 PM Ernesto Roca [M19.90] Arthritis                  Ernesto Roca DO  04/24/25 6924

## 2025-04-28 LAB
ATRIAL RATE: 65 BPM
P AXIS: -1 DEGREES
PR INTERVAL: 180 MS
QRS AXIS: -22 DEGREES
QRSD INTERVAL: 82 MS
QT INTERVAL: 414 MS
QTC INTERVAL: 430 MS
T WAVE AXIS: 22 DEGREES
VENTRICULAR RATE: 65 BPM

## 2025-04-28 PROCEDURE — 93010 ELECTROCARDIOGRAM REPORT: CPT | Performed by: INTERNAL MEDICINE
